# Patient Record
Sex: MALE | Race: WHITE | HISPANIC OR LATINO | Employment: FULL TIME | URBAN - METROPOLITAN AREA
[De-identification: names, ages, dates, MRNs, and addresses within clinical notes are randomized per-mention and may not be internally consistent; named-entity substitution may affect disease eponyms.]

---

## 2018-07-23 ENCOUNTER — LAB VISIT (OUTPATIENT)
Dept: LAB | Facility: HOSPITAL | Age: 43
End: 2018-07-23
Attending: FAMILY MEDICINE
Payer: COMMERCIAL

## 2018-07-23 ENCOUNTER — OFFICE VISIT (OUTPATIENT)
Dept: FAMILY MEDICINE | Facility: CLINIC | Age: 43
End: 2018-07-23
Payer: COMMERCIAL

## 2018-07-23 VITALS
TEMPERATURE: 98 F | SYSTOLIC BLOOD PRESSURE: 105 MMHG | HEIGHT: 74 IN | WEIGHT: 265.63 LBS | HEART RATE: 71 BPM | OXYGEN SATURATION: 97 % | BODY MASS INDEX: 34.09 KG/M2 | DIASTOLIC BLOOD PRESSURE: 78 MMHG

## 2018-07-23 DIAGNOSIS — M25.521 RIGHT ELBOW PAIN: ICD-10-CM

## 2018-07-23 DIAGNOSIS — Z00.00 VISIT FOR WELL MAN HEALTH CHECK: ICD-10-CM

## 2018-07-23 DIAGNOSIS — Z23 NEED FOR DIPHTHERIA-TETANUS-PERTUSSIS (TDAP) VACCINE: ICD-10-CM

## 2018-07-23 DIAGNOSIS — Z00.00 VISIT FOR WELL MAN HEALTH CHECK: Primary | ICD-10-CM

## 2018-07-23 LAB
25(OH)D3+25(OH)D2 SERPL-MCNC: 36 NG/ML
ALBUMIN SERPL BCP-MCNC: 4.1 G/DL
ALP SERPL-CCNC: 69 U/L
ALT SERPL W/O P-5'-P-CCNC: 191 U/L
ANION GAP SERPL CALC-SCNC: 8 MMOL/L
AST SERPL-CCNC: 81 U/L
BASOPHILS # BLD AUTO: 0.03 K/UL
BASOPHILS NFR BLD: 0.5 %
BILIRUB SERPL-MCNC: 0.7 MG/DL
BUN SERPL-MCNC: 15 MG/DL
CALCIUM SERPL-MCNC: 9.8 MG/DL
CHLORIDE SERPL-SCNC: 100 MMOL/L
CHOLEST SERPL-MCNC: 219 MG/DL
CHOLEST/HDLC SERPL: 6.8 {RATIO}
CO2 SERPL-SCNC: 28 MMOL/L
CREAT SERPL-MCNC: 0.9 MG/DL
DIFFERENTIAL METHOD: NORMAL
EOSINOPHIL # BLD AUTO: 0.1 K/UL
EOSINOPHIL NFR BLD: 1.4 %
ERYTHROCYTE [DISTWIDTH] IN BLOOD BY AUTOMATED COUNT: 12.1 %
EST. GFR  (AFRICAN AMERICAN): >60 ML/MIN/1.73 M^2
EST. GFR  (NON AFRICAN AMERICAN): >60 ML/MIN/1.73 M^2
ESTIMATED AVG GLUCOSE: 100 MG/DL
GLUCOSE SERPL-MCNC: 91 MG/DL
HBA1C MFR BLD HPLC: 5.1 %
HCT VFR BLD AUTO: 47.7 %
HDLC SERPL-MCNC: 32 MG/DL
HDLC SERPL: 14.6 %
HGB BLD-MCNC: 15.8 G/DL
IMM GRANULOCYTES # BLD AUTO: 0.02 K/UL
IMM GRANULOCYTES NFR BLD AUTO: 0.3 %
LDLC SERPL CALC-MCNC: 145.2 MG/DL
LYMPHOCYTES # BLD AUTO: 2.5 K/UL
LYMPHOCYTES NFR BLD: 38.8 %
MCH RBC QN AUTO: 28.5 PG
MCHC RBC AUTO-ENTMCNC: 33.1 G/DL
MCV RBC AUTO: 86 FL
MONOCYTES # BLD AUTO: 0.6 K/UL
MONOCYTES NFR BLD: 9.1 %
NEUTROPHILS # BLD AUTO: 3.2 K/UL
NEUTROPHILS NFR BLD: 49.9 %
NONHDLC SERPL-MCNC: 187 MG/DL
NRBC BLD-RTO: 0 /100 WBC
PLATELET # BLD AUTO: 305 K/UL
PMV BLD AUTO: 10 FL
POTASSIUM SERPL-SCNC: 4.1 MMOL/L
PROT SERPL-MCNC: 7.8 G/DL
RBC # BLD AUTO: 5.54 M/UL
SODIUM SERPL-SCNC: 136 MMOL/L
TRIGL SERPL-MCNC: 209 MG/DL
TSH SERPL DL<=0.005 MIU/L-ACNC: 1.45 UIU/ML
WBC # BLD AUTO: 6.37 K/UL

## 2018-07-23 PROCEDURE — 85025 COMPLETE CBC W/AUTO DIFF WBC: CPT

## 2018-07-23 PROCEDURE — 99999 PR PBB SHADOW E&M-NEW PATIENT-LVL IV: CPT | Mod: PBBFAC,,, | Performed by: FAMILY MEDICINE

## 2018-07-23 PROCEDURE — 84443 ASSAY THYROID STIM HORMONE: CPT

## 2018-07-23 PROCEDURE — 99386 PREV VISIT NEW AGE 40-64: CPT | Mod: 25,S$GLB,, | Performed by: FAMILY MEDICINE

## 2018-07-23 PROCEDURE — 90715 TDAP VACCINE 7 YRS/> IM: CPT | Mod: S$GLB,,, | Performed by: FAMILY MEDICINE

## 2018-07-23 PROCEDURE — 36415 COLL VENOUS BLD VENIPUNCTURE: CPT | Mod: PO

## 2018-07-23 PROCEDURE — 82306 VITAMIN D 25 HYDROXY: CPT

## 2018-07-23 PROCEDURE — 90471 IMMUNIZATION ADMIN: CPT | Mod: S$GLB,,, | Performed by: FAMILY MEDICINE

## 2018-07-23 PROCEDURE — 83036 HEMOGLOBIN GLYCOSYLATED A1C: CPT

## 2018-07-23 PROCEDURE — 80061 LIPID PANEL: CPT

## 2018-07-23 PROCEDURE — 80053 COMPREHEN METABOLIC PANEL: CPT

## 2018-07-23 RX ORDER — IBUPROFEN 600 MG/1
600 TABLET ORAL
Qty: 42 TABLET | Refills: 0 | Status: SHIPPED | OUTPATIENT
Start: 2018-07-23 | End: 2018-08-06

## 2018-07-23 NOTE — PROGRESS NOTES
Subjective:       Patient ID: Sergey Cabrera is a 43 y.o. male.    Chief Complaint: Annual Exam and Establish Care    Sergey Cabrera is a 43 y.o. male who presents today to establish care.     Diet: he usually eats out more then 1/2 his meals. He eats fast food rarely. He usually eats a lot of chinese buffets. He drinks mostly water. Soda about every other day. Doesn't drink juice or sweet tea. Drinks coffee every morning with sweet and low.   Exercise: he doesn't exercise outside of work. He plays softball occasionally.     Tdap: none since Alisha  Labs: ordered    C-scope: at 52    PMHx: reviewed in EMR and updated  Meds: reviewed in EMR and updated  Shx: reviewed in EMR and updated  FMHx: no family history of colon cancer, breast cancer, ovarian cancer  Social: lives with his wife and 4 kids (one from a previous marriage). He works for [employer]. No pets at home. No smokers at home.       Review of Systems   Constitutional: Negative for chills and fever.   Respiratory: Negative for cough and shortness of breath.    Cardiovascular: Negative for chest pain and palpitations.   Gastrointestinal: Negative for constipation, diarrhea, nausea and vomiting.   Genitourinary: Negative for difficulty urinating.   Skin: Negative for rash.         Health Maintenance Due   Topic Date Due    Lipid Panel  11/14/2012       Objective:     Vitals:    07/23/18 1308   BP: 105/78   Pulse: 71   Temp: 98.2 °F (36.8 °C)        Physical Exam   Constitutional: He is oriented to person, place, and time. He appears well-developed and well-nourished.   HENT:   Head: Normocephalic and atraumatic.   Right Ear: Tympanic membrane, external ear and ear canal normal.   Left Ear: Tympanic membrane, external ear and ear canal normal.   Nose: Nose normal.   Mouth/Throat: Oropharynx is clear and moist and mucous membranes are normal. No tonsillar exudate.   Eyes: Conjunctivae are normal. Pupils are equal, round, and reactive to light.   Neck: Normal  range of motion. Neck supple.   Cardiovascular: Normal rate, regular rhythm and normal heart sounds.    Pulmonary/Chest: Effort normal and breath sounds normal.   Abdominal: Soft. He exhibits no distension. There is no tenderness.   obese   Musculoskeletal:   Self described right elbow pain. Unable to reproduce during exam. No limitation in strength or sensation.    Neurological: He is alert and oriented to person, place, and time. No cranial nerve deficit or sensory deficit. He exhibits normal muscle tone.   Skin: Skin is warm and dry.   Psychiatric: He has a normal mood and affect. His speech is normal and behavior is normal. Judgment and thought content normal.   Nursing note and vitals reviewed.      Assessment:       1. Visit for well man health check    2. Need for diphtheria-tetanus-pertussis (Tdap) vaccine    3. Right elbow pain    4. BMI 34.0-34.9,adult        Plan:         Visit for well man health check  ?Avoid tobacco  ?Be physically active  ?Maintain a healthy weight  ?Eat a diet rich in fruits, vegetables, and whole grains, and low in saturated/trans fat  ?Limit alcohol consumption  ?Protect against sexually transmitted infections  ?Avoid excess sun  -     CBC auto differential; Future; Expected date: 07/23/2018  -     Comprehensive metabolic panel; Future; Expected date: 07/23/2018  -     Hemoglobin A1c; Future; Expected date: 07/23/2018  -     Lipid panel; Future; Expected date: 07/23/2018  -     TSH; Future; Expected date: 07/23/2018  -     Vitamin D; Future; Expected date: 07/23/2018    Need for diphtheria-tetanus-pertussis (Tdap) vaccine  -     Tdap Vaccine    Right elbow pain  Ibuprofen  Continue use of brace  PT  RTC if not improved, can consider imaging and/or referral at that time  -     ibuprofen (ADVIL,MOTRIN) 600 MG tablet; Take 1 tablet (600 mg total) by mouth 3 (three) times daily with meals. for 14 days  Dispense: 42 tablet; Refill: 0  -     Ambulatory Referral to Physical/Occupational  Therapy    BMI 34.0-34.9,adult  See AVS  Decrease soda intake  Start exercise regimen

## 2018-07-23 NOTE — PATIENT INSTRUCTIONS
Small sustainable changes  Increase intake of fiber  Decrease carb intake  Decrease drinks with added sugar such as soda or juice  Milk and plain yogurt  Dressings, seasonings, condiments, etc should have less than 2 g sugars.   beans or nuts can have 1 x a day.   1-2 servings of citrus fruits, berries, pineapple or melon a day (1/2 cup)  Avoid fried foods  No grains, rice, pasta, potatoes, bread, corn, peas, oatmeal, grits, tortillas, crackers, chips  Increase exercise  Handouts given  Consider BIOeCON lynne    ?Avoid tobacco  ?Be physically active  ?Maintain a healthy weight  ?Eat a diet rich in fruits, vegetables, and whole grains, and low in saturated/trans fat  ?Limit alcohol consumption  ?Protect against sexually transmitted infections  ?Avoid excess sun        4 Steps for Eating Healthier  Changing the way you eat can improve your health. It can lower your cholesterol and blood pressure, and help you stay at a healthy weight. Your diet doesnt have to be bland and boring to be healthy. Just watch your calories and follow these steps:    1. Eat fewer unhealthy fats  · Choose more fish and lean meats instead of fatty cuts of meat.  · Skip butter and lard, and use less margarine.  · Pass on foods that have palm, coconut, or hydrogenated oils.  · Eat fewer high-fat dairy foods like cheese, ice cream, and whole milk.  · Get a heart-healthy cookbook and try some low-fat recipes.  2. Go light on salt  · Keep the saltshaker off the table.  · Limit high-salt ingredients, such as soy sauce, bouillon, and garlic salt.  · Instead of adding salt when cooking, season your food with herbs and flavorings. Try lemon, garlic, and onion.  · Limit convenience foods, such as boxed or canned foods and restaurant food.  · Read food labels and choose lower-sodium options.  3. Limit sugar  · Pause before you add sugars to pancakes, cereal, coffee, or tea. This includes white and brown table sugar, syrup, honey, and molasses. Cut  your usual amount by half.  · Use non-sugar sweeteners. Stevia, aspartame, and sucralose can satisfy a sweet tooth without adding calories.  · Swap out sugar-filled soda and other drinks. Buy sugar-free or low-calorie beverages. Remember water is always the best choice.  · Read labels and choose foods with less added sugar. Keep in mind that dairy foods and foods with fruit will have some natural sugar.  · Cut the sugar in recipes by 1/3 to 1/2. Boost the flavor with extracts like almond, vanilla, or orange. Or add spices such as cinnamon or nutmeg.  4. Eat more fiber  · Eat fresh fruits and vegetables every day.  · Boost your diet with whole grains. Go for oats, whole-grain rice, and bran.  · Add beans and lentils to your meals.  · Drink more water to match your fiber increase. This is to help prevent constipation.  Date Last Reviewed: 5/11/2015  © 5316-9459 [employer]. [address] [place] [address]. All rights reserved. This information is not intended as a substitute for professional medical care. Always follow your healthcare professional's instructions.        Understanding Fat and Cholesterol  Too much cholesterol in your blood can lead to many problems such as blocked arteries. This can lead to heart attack and stroke. One of the best ways to manage heart and blood vessel disease is to lower your blood cholesterol. Planning meals that are low in saturated fat and cholesterol helps reduce the level of cholesterol in your blood. Below are eating tips to help lower your blood cholesterol levels.  Eat less fat  A healthy goal is to have less than 25% to 35% of your daily calories come from fat. Instead of fats, eat more fruits, whole-grains, and vegetables. This also helps control your weight, and can even reduce your risk for some cancers. There are different kinds of fats in foods. Fats can be saturated, unsaturated, or trans fats. The best fats to choose are unsaturated fats. But  fats are high in calories, so eat even unsaturated fats sparingly.  Limit foods high in saturated fats  Saturated fats come from animals and certain plants (such as coconut and palm). Eating too much saturated fat can raise your blood cholesterol levels and make your artery problems worse. Your goal is to eat less saturated fat. Below are some examples of foods that contain lots of saturated fat:  · Fatty cuts of meat (lamb, ham, beef)  · Many pastries, cakes, cookies, and candies  · Cream, ice cream, sour cream, cheese, and butter, and foods made with them  · Sauces made with butter or cream  · Salad dressings with saturated fats  · Foods that contain palm or coconut oil  Choose unsaturated fats  Unsaturated fats are usually liquid at room temperature. They are better choices for your heart than saturated fat. There are two types of unsaturated fats: polyunsaturated fat and monounsaturated fat. Aim to replace saturated fats with polyunsaturated or monounsaturated fats.  · Polyunsaturated fats are found in corn oil, safflower oil, sunflower oil, and other vegetable oils.  · Monounsaturated fats are found in olive oil, canola oil, and peanut oil. Some margarines and spreads are now made with these oils, too. Avocados are also high in monounsaturated fat.  Of all fats, monounsaturated fats are the least harmful to your heart.  Avoid trans fats  Like saturated fats, trans fats have been linked to heart disease. Even a small amount can harm your health. Trans fats are found in liquid oils that have been changed to be solid at room temperature. Margarine, which is often made from vegetable oil, is one example. Vegetable shortening is another. Trans fats are often found in packaged goods. Check ingredients for the words hydrogenated or partially hydrogenated. They mean the foods contain trans fat.  What about triglycerides?  Triglycerides are a type of fat in your blood. Like cholesterol, high levels of triglycerides  can lead to blocked arteries. High triglyceride levels can be reduced 20% to 50%  by limiting added sugars in your diet, susbstituting healthier fats for saturated and trans fats, getting more physical activity, and losing weight if your are overweight. You may also be advised to avoid or limi alcohol.    Reading food labels  Luckily, most foods now have labels giving you the facts about what youre eating. Reading food labels helps you make healthy choices. Look for the words highlighted below.  · Serving Size. This is the amount of food in 1 serving. If you eat larger portions, be sure to count more of everything: fat, calories, and cholesterol.  · Total Fat. Tells you how many grams (g) of fat are in 1 serving.  · Calories from Fat. This tells you the total number of calories from fat in 1 serving (there are 9 calories per gram of fat). Look for foods with the fewest calories from fat.  · Saturated Fat. Tells you how many grams (g) of saturated fat are in 1 serving.  · Trans Fat. Tells how many grams (g) of trans fat are in 1 serving.  · Cholesterol. Tells you how many milligrams (mg) of cholesterol are in 1 serving.  Date Last Reviewed: 5/11/2015  © 9668-1780 [employer]. [address] [place] [address], [place] [address]. All rights reserved. This information is not intended as a substitute for professional medical care. Always follow your healthcare professional's instructions.

## 2018-07-24 ENCOUNTER — TELEPHONE (OUTPATIENT)
Dept: FAMILY MEDICINE | Facility: CLINIC | Age: 43
End: 2018-07-24

## 2018-07-24 DIAGNOSIS — R79.89 ELEVATED LFTS: Primary | ICD-10-CM

## 2018-07-24 NOTE — TELEPHONE ENCOUNTER
I spoke with patient and informed him of his labs. I scheduled follow up US and Labs.Follow up appointment scheduled. Patient voiced understanding.

## 2018-07-24 NOTE — TELEPHONE ENCOUNTER
Please call patient with his lab results.     He has elevated cholesterol, high triglycerides, and low HDL. Triglycerides improve with a change in diet. He needs to eat out less and decrease intake of soda/sweet tea.     He also had elevated liver function tests (AST 81 and ). I have ordered a workup. Please schedule the US and labs along with a follow up in aprox 1 month.

## 2018-07-26 ENCOUNTER — TELEPHONE (OUTPATIENT)
Dept: FAMILY MEDICINE | Facility: CLINIC | Age: 43
End: 2018-07-26

## 2018-07-26 NOTE — TELEPHONE ENCOUNTER
----- Message from Amanda Benites sent at 7/26/2018  9:44 AM CDT -----  Contact: [phone]/self  Pt requesting to speak with you concerning canceling his upcoming ultrasound.  He would like to speak with the nurse before he cancels.    Please call and advise

## 2018-07-26 NOTE — TELEPHONE ENCOUNTER
Patient state that he have a fatty liver and would like to make you aware of that.Patient would like to know if this can effect the count? Patient also state that he have high triglycerides and he doesn't feel that a ultra sound is necessary. Patient believe if he eat right and exercise  He can get triglycerides down. Patient said that he don't want to be on medication but if you want him then he will.Please advise.

## 2018-07-27 NOTE — TELEPHONE ENCOUNTER
I spoke with patient and advised him that  would like for him to get the US of the liver.Patient state that he think its a waste of time but he'll get it done. I advised patient what day the ultra sound is scheduled.Patient voiced understanding.

## 2018-08-20 ENCOUNTER — CLINICAL SUPPORT (OUTPATIENT)
Dept: REHABILITATION | Facility: HOSPITAL | Age: 43
End: 2018-08-20
Attending: FAMILY MEDICINE
Payer: COMMERCIAL

## 2018-08-20 DIAGNOSIS — M25.521 PAIN IN RIGHT ELBOW: ICD-10-CM

## 2018-08-20 PROCEDURE — 97165 OT EVAL LOW COMPLEX 30 MIN: CPT | Mod: PN

## 2018-08-20 PROCEDURE — 97140 MANUAL THERAPY 1/> REGIONS: CPT | Mod: PN

## 2018-08-20 NOTE — PATIENT INSTRUCTIONS
[facility]  OCCUPATIONAL THERAPY  HOME EXERCISE PROGRAM        [place]: [phone]  Hold each stretch 30 sec, repeat 4xday         Therapist: LIZA Munoz, OTR/L, CHT   Occupational therapist, Certified Hand Therapist

## 2018-08-20 NOTE — PLAN OF CARE
[facility] Occupational Therapy  Initial Evaluation     Name: Sergey Cabrera  Clinic Number: [id number]    Medical Diagnosis: Right elbow pain  Date of Onset: 2-3 months ago  Date of Surgery: NA  Surgical Procedure: NA  Therapy Diagnosis:   Encounter Diagnosis   Name Primary?    Pain in right elbow      Precautions: Standard    Physician: Ze Mejia DO  Physician Orders: eval and treat  Date of Return to MD: 10/29/2018    Evaluation Date: 8/20/2018  Plan of Care Certification Period: 08/20/2018-10/20/2018    Visit #: 1/ Visits authorized: 30  Insurance Authorization period Expiration: 12/31/2018    Time In:1PM  Time Out: 2PM  Total Billable Time: 60 minutes  Charges for this Visit: Low eval      Subjective     Involved Side:  right  Dominant Side: Right  Imaging: N/A  History of Current Condition: Started after playing softball, comes and goes, wrist extension and  aggravate it.   Previous Therapy: None    Past Medical History/Physical Systems Review:   No past medical history on file.  Sergey Cabrera  has a past surgical history that includes Tonsillectomy.    Sergey currently has no medications in their medication list.    Review of patient's allergies indicates:   Allergen Reactions    Grass pollen-ashleigh grass standard     Tree pollen-riddhi         Pain:  Functional Pain Scale Rating 0-10:   0/10 at current  0/10 at best  3/10 at worst  Location: posterior elbow, extensor muscle wad  Description: Aching and Sharp  Aggravating Factors: Bending, Flexing and Lifting  Easing Factors: rest and position change      Patient's Goals for Therapy: See Assessment chart below.    Occupation:  See Assessment chart below.     Functional Limitations/Social History: See Assessment chart below.       Objective     Observation/Appearance:       Elbow and Wrist ROM. Measured in degrees.   8/20/2018 8/20/2018      Left Right     Elbow Ext/Flex 0/140 0/135     Supination/Pronation WNL WNL slight  discomfort with sup.      Wrist Ext/Flex WNL WNL     Wrist RD/UD WNL WNL        Strength (Dynamometer) and Pinch Strength (Pinch Gauge)  Measured in pounds.   8/20/2018 8/20/2018          Left Right     Rung II elbow flx 95 95     Rung II elbow extended 100 115     Key Pinch 20 21     3pt Pinch 21 19     2pt Pinch 14 15       Sensation:    Numbness reported in bilateral hands reported during use of ellipitical machine at gym, pain in posterior elbow when resting on arm rest, numbness in bilateral hands up arms (non-localized )     Manual Muscle Test   8/20/2018 8/20/2018      Left Right     Wrist Extension  5/5 4+/5      Wrist Flexion 5/5 4+/5       Special Tests  Tennis Elbow Test +   Resisted Middle Finger Extension Test +     CMS Impairment/Limitation/Restriction for FOTO Survey  Therapist reviewed FOTO scores for Sergey Cabrera.  FOTO documents entered into [facility] [employer] - see Media section.    Intake Limitation Score: 44% - 8/20/2018       Treatment     Treatment Time In (separate from total time): 125PM  Treatment Time Out (separate from total time: 137PM  Total Treatment Time: 12 min      Patient received MH x 10 min to right elbow to increase blood flow, circulation and tissue elasticity prior to therex    Astym stretches 1-3 x 30 sec each pre Astym and post Astym.     ASTYM treatment provided by ANDREW Jmienez (see progress note)     Issued HEP and educated on modality use for pain management (see patient instructions). Pt verbally understood the instructions as they were given and demonstrated proper form and technique during therapy. Pt was advised to perform these exercises free of pain, and to stop performing them if pain occurs.       Patient/Family Education: role of OT, goals for OT, scheduling/cancellations - pt verbalized understanding. Discussed insurance limitations with patient.    Assessment       Sergey Cabrera is a 43 y.o. male referred to outpatient occupational/hand therapy and  presents with a medical diagnosis of Right Elbow Pain and demonstrates limitations as described in the chart below.  Pain with repetitive lifting with wrist extension, with pinching small objects such as screws. Following evaluation and brief medical record review it is determined that pt will benefit from occupational therapy services in order to maximize pain free and/or functional use of right UE. The following goals were discussed with the patient and patient is in agreement with them as to be addressed in the treatment plan. The patient's rehab potential is Excellent.     Anticipated barriers to occupational therapy: none  Pt has no cultural, educational or language barriers to learning provided.    Profile and History Assessment of Occupational Performance Level of Clinical Decision Making Complexity Score   Occupational Profile:   Sergey Cabrera is a 43 y.o. 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    Sergey Cabrera has difficulty with  manual tool use, fishing, repetitive wrist extensionFishing  affecting his daily functional abilities. His main goal for therapy is decrease pain to increase ability to do his job and leisure activities.     Previous functional status: Independent with all ADLs.     Comorbidities:   See PMH and Physical Systems Review Section above.    Medical and Therapy History Review:   Brief               Performance Deficits    Physical:  Muscle Power/Strength  Muscle Endurance   Strength  Pinch Strength  Gross Motor Coordination  Fine Motor Coordination  Pain    Cognitive:  No Deficits    Psychosocial:    No Deficits     Clinical Decision Making:  low    Assessment Process:  Problem-Focused Assessments    Modification/Need for Assistance:  Not Necessary    Intervention Selection:  Limited Treatment  Options       low  Based on PMHX, co morbidities , data from assessments and functional level of assistance required with task and clinical presentation directly impacting function.         Goals     Long Term Goals (LTGs); to be met by discharge.  LTG #1: Pt will report a pain level of 0 out of 10 with fishing    LTG #2: Pt will demo improved FOTO score by 20 points.   LTG #3: Pt will return to prior level of function for ADLs and household management.     Short Term Goals (STGs); to be met within 4 weeks (09/20/2018).  STG #1a: Pt will report 1 out of 10 pain level with work ADLs.  STG #2a: Pt will report/demo [place] with putting out and picking up orange traffic cones for work.  STG #2b: Pt will report/demo [place] with use of manual tools and gripping.  STG #3a: Pt will demonstrate independence with issued HEP.       Plan     Pt to be treated by Occupational Therapy 2 times per week for 8  weeks during the certification period from 8/20/2018 to 10/20/2018 to achieve the established goals.     Treatment to include: Manual therapy/joint mobilizations, Modalities for pain management, US 3 mhz, Therapeutic exercises/activities., Strengthening, Orthotic Fabrication/Fit/Training, Joint Protection, Energy Conservation and astym, Kinesiology taping, as well as any other treatments deemed necessary based on the patient's needs or progress.     Therapist: LIZA Munoz, OTR/L, CHT   Occupational therapist, Certified Hand Therapist       I certify the need for these services furnished under this plan of treatment and while under my care    ____________________________________                         __________________  Physician/Referring Practitioner                                               Date of Signature

## 2018-08-21 NOTE — PROGRESS NOTES
Occupational Therapy Daily Treatment Note      Date: 8/20/2018  Name: Sergey Cabrera  Clinic Number: [id number]    Medical Diagnosis: R Elbow pain  Date of Onset: 2-3 months ago  Date of Surgery: NA  Surgical Procedure: NA  Therapy Diagnosis:   Encounter Diagnosis   Name Primary?    Pain in right elbow      Precautions: Standard    Physician: Ze Mejia DO  Physician Orders: eval and treat   Date of Return to MD: 10/29/2018    Evaluation Date: 8/20/2018  Plan of Care Certification Period: 8/20/2018-10/20/2018    Visit #: 1/ Visits authorized: 30  Insurance Authorization period Expiration: 12/31/2018    Time In:137  Time Out: 147  Total Billable Time: 10 minutes  Charges for this Visit: MT1    Subjective     See Fabiana fuentes    Objective     Sergey received the following manual therapy techniques for 10 minutes:   Pt received Astym UE progression for lateral epicondylitis treatment to right    Home Exercises and Education Provided     Education provided: on ASTYM precautions and what to expect  - Progress towards goals     Assessment     Pt with signs and symptoms of fibrotic tissue noted during ASTYM treatment however tolerated well. Pt would continued to benefit from continues progression of ASTYM treatment.     Goals     See Fabiana Fuentes    Plan     Continue skilled occupational therapy with individualized plan of care 2x/week for 8 weeks from 8/20/2018 to 10/20/2018.    Discussed Plan of Care with patient: Yes    Lluvia MCDANIELS

## 2018-08-23 ENCOUNTER — CLINICAL SUPPORT (OUTPATIENT)
Dept: REHABILITATION | Facility: HOSPITAL | Age: 43
End: 2018-08-23
Attending: FAMILY MEDICINE
Payer: COMMERCIAL

## 2018-08-23 DIAGNOSIS — M25.521 PAIN IN RIGHT ELBOW: ICD-10-CM

## 2018-08-23 PROCEDURE — 97140 MANUAL THERAPY 1/> REGIONS: CPT | Mod: PN

## 2018-08-23 PROCEDURE — 97110 THERAPEUTIC EXERCISES: CPT | Mod: PN

## 2018-08-23 NOTE — PROGRESS NOTES
"  Occupational Therapy Daily Treatment Note      Date: 8/23/2018  Name: Sergey Cabrera  Clinic Number: [id number]    Medical Diagnosis: R Elbow pain  Date of Onset: 2-3 months ago  Date of Surgery: NA  Surgical Procedure: NA  Therapy Diagnosis: R elbow pain  Precautions: Standard    Physician: Ze Mejia DO  Physician Orders: eval and treat   Date of Return to MD: 10/29/2018    Evaluation Date: 8/20/2018  Plan of Care Certification Period: 8/20/2018-10/20/2018    Visit #: 2/ Visits authorized: 30  Insurance Authorization period Expiration: 12/31/2018    Time In:700  Time Out: 745  Total Billable Time: 35 minutes  Charges for this Visit: MT2 TE1    Subjective   2/10  "I've been doing my stretches, the treatment seems to be helping, the pain isn't as sharp as it was."    Objective     Sergey performed the following therex UBE @120 rpms x 6min for/back. ASTYM stretches for wrist extensors, flexors, and triceps 3x30".     Sergey received the following manual therapy techniques for 25 minutes:   Pt received Astym tx #2 UE progression for elbow, wrist and hand treatment to right  Repeat stretches  Kinesiotaping: I strip applied to inhibit wrist extensors and triceps. Patient instructed on purpose, wear, care, precautions to monitor and removal of KT. Patient verbalized understanding of all instructions provided.   CP x 10 minutes for pain mgmt after session    Home Exercises and Education Provided     Education provided: on ASTYM precautions and what to expect  - Progress towards goals     Assessment     Pt with signs and symptoms of fibrotic tissue noted during ASTYM treatment however tolerated well. Improved from previous session. Good compliance with stretches and resting RUE with work tasks. Pain improving. Pt would continued to benefit from continues progression of ASTYM treatment.     Goals     Long Term Goals (LTGs); to be met by discharge.  LTG #1: Pt will report a pain level of 0 out of 10 with fishing        "        LTG #2: Pt will demo improved FOTO score by 20 points.   LTG #3: Pt will return to prior level of function for ADLs and household management.      Short Term Goals (STGs); to be met within 4 weeks (09/20/2018).  STG #1a: Pt will report 1 out of 10 pain level with work ADLs.  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  STG #2b: Pt will report/demo [place] with use of manual tools and gripping.  STG #3a: Pt will demonstrate independence with issued HEP.        Plan     Continue skilled occupational therapy with individualized plan of care 2x/week for 8 weeks from 8/20/2018 to 10/20/2018.    Discussed Plan of Care with patient: Yes    Lluvia CUI/MAZIN

## 2018-08-28 ENCOUNTER — CLINICAL SUPPORT (OUTPATIENT)
Dept: REHABILITATION | Facility: HOSPITAL | Age: 43
End: 2018-08-28
Attending: FAMILY MEDICINE
Payer: COMMERCIAL

## 2018-08-28 DIAGNOSIS — M25.521 PAIN IN RIGHT ELBOW: ICD-10-CM

## 2018-08-28 PROCEDURE — 97110 THERAPEUTIC EXERCISES: CPT | Mod: PN

## 2018-08-28 PROCEDURE — 97140 MANUAL THERAPY 1/> REGIONS: CPT | Mod: PN

## 2018-08-28 NOTE — PROGRESS NOTES
"  Occupational Therapy Daily Treatment Note      Date: 8/28/2018  Name: Sergey Cabrera  Clinic Number: [id number]    Medical Diagnosis: R Elbow pain  Date of Onset: 2-3 months ago  Date of Surgery: NA  Surgical Procedure: NA  Therapy Diagnosis: R elbow pain  Precautions: Standard    Physician: Ze Mejia DO  Physician Orders: eval and treat   Date of Return to MD: 10/29/2018    Evaluation Date: 8/20/2018  Plan of Care Certification Period: 8/20/2018-10/20/2018    Visit #: 3/ Visits authorized: 30  Insurance Authorization period Expiration: 12/31/2018    Time In:650  Time Out:740  Total Billable Time: 40 minutes  Charges for this Visit: MT2 TE1    Subjective   5/10  "I went fishing over the weekend and I didn't have a problem, I'm just a little sore."    Objective     Sergey performed the following therex UBE @120 rpms x 6min for/back. ASTYM stretches for wrist extensors, flexors, and triceps 3x30".     Sergey received the following manual therapy techniques for 25 minutes:   Pt received Astym tx #3 UE progression for elbow, wrist and hand treatment to right  Repeat stretches    Exercises    Nirschl's 1-4 3  2/10   Tbar twists 10x   Triceps ext 4#  2/10   Elbow flex 3 ways 4#  10x   Tband Lats Blue  2/15   Rows Blue  2/15   Wall clocks Green  2/10                 CP x 10 minutes for pain mgmt after session    Home Exercises and Education Provided     Education provided: on ASTYM precautions and what to expect  - Progress towards goals     Assessment     Pt with signs and symptoms of fibrotic tissue noted during ASTYM treatment however tolerated well. Improved from previous session. Overall pain with activity improving. He tolerated progression of treatment well.  Good compliance with stretches and resting RUE with work tasks. Pt would continued to benefit from continues progression of ASTYM treatment.     Goals     Long Term Goals (LTGs); to be met by discharge.  LTG #1: Pt will report a pain level of 0 out of 10 " with fishing               LTG #2: Pt will demo improved FOTO score by 20 points.   LTG #3: Pt will return to prior level of function for ADLs and household management.      Short Term Goals (STGs); to be met within 4 weeks (09/20/2018).  STG #1a: Pt will report 1 out of 10 pain level with work ADLs.  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  STG #2b: Pt will report/demo [place] with use of manual tools and gripping.  STG #3a: Pt will demonstrate independence with issued HEP.        Plan     Continue skilled occupational therapy with individualized plan of care 2x/week for 8 weeks from 8/20/2018 to 10/20/2018.    Discussed Plan of Care with patient: Yes    Lluvia CUI/MAZIN

## 2018-08-29 NOTE — PROGRESS NOTES
"  Occupational Therapy Daily Treatment Note      Date: 8/30/2018  Name: Sergey Cabrera  Clinic Number: [id number]    Medical Diagnosis: R Elbow pain  Date of Onset: 2-3 months ago  Date of Surgery: NA  Surgical Procedure: NA  Therapy Diagnosis: R elbow pain  Precautions: Standard    Physician: Ze Mejia DO  Physician Orders: eval and treat   Date of Return to MD: 10/29/2018    Evaluation Date: 8/20/2018  Plan of Care Certification Period: 8/20/2018-10/20/2018    Visit #: 4/ Visits authorized: 30  Insurance Authorization period Expiration: 12/31/2018    Time In:700  Time Out:755  Total Billable Time: 55 minutes  Charges for this Visit: MT2 TE2    Subjective   3/10  "I'm hurting today I went to push up on this arm the other night and it flared it up."    Objective     Sergey performed the following therex UBE @120 rpms x 6min for/back. ASTYM stretches for wrist extensors, flexors, and triceps 3x30".     Sergey received the following manual therapy techniques for 25 minutes:   Pt received Astym tx #4 UE progression for elbow, wrist and hand treatment to right  Repeat stretches    Exercises    Nirschl's 1-4 3  2/10   Tbar twists 10x   Triceps ext 4#  2/10   Elbow flex 3 ways 4#/3#prone  10x   Tband Lats Blue  2/15   Rows Blue  2/15   Wall clocks Green  2/10   Ballet bar pushups 2/15             Elbow and Wrist ROM. Measured in degrees.    8/20/2018 8/20/2018 08/30/2018          Left Right Right      Elbow Ext/Flex 0/140 0/135 WNL      Supination/Pronation WNL WNL slight discomfort with sup.   WNL     Wrist Ext/Flex WNL WNL  WNL     Wrist RD/UD WNL WNL  WNL         Strength (Dynamometer) and Pinch Strength (Pinch Gauge)  Measured in pounds.    8/20/2018 8/20/2018 08/30/2018                Left Right Right      Rung II elbow flx 95 95 100      Rung II elbow extended 100 115  115     Key Pinch 20 21  21     3pt Pinch 21 19  15     2pt Pinch 14 15  15        Manual Muscle Test    8/20/2018 8/20/2018 08/30/2018      "     Left Right Right      Wrist Extension  5/5 4+/5  4/5      Wrist Flexion 5/5 4+/5 5/5        Special Tests  Tennis Elbow Test +   Resisted Middle Finger Extension Test -     Kinesiotaping: Space correction with bio freeze to lat epi and med epi. Patient instructed on purpose, wear, care, precautions to monitor and removal of KT. Patient verbalized understanding of all instructions provided.     CP x 10 minutes for pain mgmt after session    Home Exercises and Education Provided     Education provided: on ASTYM precautions and what to expect  - Progress towards goals     Assessment     Pt with increased pain at lat epi today. Despite increased pain his objective measures improved since IE. He was also able to complete all therex with only minimal c/o pain. Pt continues signs and symptoms of fibrotic tissue noted during ASTYM treatment however tolerated well and continues to improve. Good compliance with stretches and resting RUE with work tasks. Pt would continued to benefit from continues progression of ASTYM treatment.     Goals     Long Term Goals (LTGs); to be met by discharge.  LTG #1: Pt will report a pain level of 0 out of 10 with fishing               LTG #2: Pt will demo improved FOTO score by 20 points.   LTG #3: Pt will return to prior level of function for ADLs and household management.      Short Term Goals (STGs); to be met within 4 weeks (09/20/2018).  STG #1a: Pt will report 1 out of 10 pain level with work ADLs.  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  STG #2b: Pt will report/demo [place] with use of manual tools and gripping.  STG #3a: Pt will demonstrate independence with issued HEP.      Plan     Continue skilled occupational therapy with individualized plan of care 2x/week for 8 weeks from 8/20/2018 to 10/20/2018.    Discussed Plan of Care with patient: Yes    Lluvia CUI/MAZIN

## 2018-08-30 ENCOUNTER — CLINICAL SUPPORT (OUTPATIENT)
Dept: REHABILITATION | Facility: HOSPITAL | Age: 43
End: 2018-08-30
Attending: FAMILY MEDICINE
Payer: COMMERCIAL

## 2018-08-30 DIAGNOSIS — M25.521 PAIN IN RIGHT ELBOW: ICD-10-CM

## 2018-08-30 PROCEDURE — 97140 MANUAL THERAPY 1/> REGIONS: CPT | Mod: PN

## 2018-08-30 PROCEDURE — 97110 THERAPEUTIC EXERCISES: CPT | Mod: PN

## 2018-09-11 ENCOUNTER — CLINICAL SUPPORT (OUTPATIENT)
Dept: REHABILITATION | Facility: HOSPITAL | Age: 43
End: 2018-09-11
Attending: FAMILY MEDICINE
Payer: COMMERCIAL

## 2018-09-11 DIAGNOSIS — M25.521 PAIN IN RIGHT ELBOW: ICD-10-CM

## 2018-09-11 PROCEDURE — 97140 MANUAL THERAPY 1/> REGIONS: CPT | Mod: PN

## 2018-09-11 PROCEDURE — 97110 THERAPEUTIC EXERCISES: CPT | Mod: PN

## 2018-09-11 NOTE — PROGRESS NOTES
"  Occupational Therapy Daily Treatment Note      Date: 9/11/2018  Name: Sergey Cabrera  Clinic Number: [id number]    Medical Diagnosis: R Elbow pain  Date of Onset: 2-3 months ago  Date of Surgery: NA  Surgical Procedure: NA  Therapy Diagnosis: R elbow pain  Precautions: Standard    Physician: Ze Mejia DO  Physician Orders: eval and treat   Date of Return to MD: 10/29/2018    Evaluation Date: 8/20/2018  Plan of Care Certification Period: 8/20/2018-10/20/2018    Visit #: 5/ Visits authorized: 30  Insurance Authorization period Expiration: 12/31/2018  FOTO:36%    Time In:655  Time Out:750  Total Billable Time: 55 minutes  Charges for this Visit: MT2 TE2    Subjective   4-5/10  "It's like an aching pain not so much a sharp pain."    Objective     Sergey performed the following therex UBE @120 rpms x 6min for/back. ASTYM stretches for wrist extensors, flexors, and triceps 3x30".     Sergey received the following manual therapy techniques for 25 minutes:   Pt received Astym tx #5 UE progression for elbow, wrist and hand treatment to right  Repeat stretches    Exercises    Nirschl's 1-4 3  2/10   Tbar twists 10x   Triceps ext 4#  2/10   Elbow flex 3 ways 4#  2/10   Tband Lats Blue  2/15   Rows/IR/ER Blue  2/15   Wall clocks Green  2/10   Ballet bar pushups 2/15             Elbow and Wrist ROM. Measured in degrees.    8/20/2018 8/20/2018 08/30/2018        Left Right Right      Elbow Ext/Flex 0/140 0/135 WNL      Supination/Pronation WNL WNL slight discomfort with sup.   WNL     Wrist Ext/Flex WNL WNL  WNL     Wrist RD/UD WNL WNL  WNL         Strength (Dynamometer) and Pinch Strength (Pinch Gauge)  Measured in pounds.    8/20/2018 8/20/2018 08/30/2018                Left Right Right      Rung II elbow flx 95 95 100      Rung II elbow extended 100 115  115     Key Pinch 20 21  21     3pt Pinch 21 19  15     2pt Pinch 14 15  15        Manual Muscle Test    8/20/2018 8/20/2018 8/30/2018        Left Right Right    "   Wrist Extension  5/5 4+/5  4/5      Wrist Flexion 5/5 4+/5 5/5        Special Tests  Tennis Elbow Test +   Resisted Middle Finger Extension Test -     Kinesiotaping: Space correction with bio freeze to lat epi and med epi. Patient instructed on purpose, wear, care, precautions to monitor and removal of KT. Patient verbalized understanding of all instructions provided.     CP x 10 minutes for pain mgmt after session    Home Exercises and Education Provided     Education provided: on ASTYM precautions and what to expect  - Progress towards goals     Assessment     Pt reports pain is improving however his pain number report is slightly higher. He was able to complete therex in a pain free ROM increasing weights with some therex. Pt continues signs and symptoms of fibrotic tissue noted during ASTYM treatment however tolerated well and continues to improve. Responds well to ASTYM and KT application. Good compliance with stretches and resting RUE with work tasks. Pt would continued to benefit from continues progression of ASTYM treatment.     Goals     Long Term Goals (LTGs); to be met by discharge.  LTG #1: Pt will report a pain level of 0 out of 10 with fishing               LTG #2: Pt will demo improved FOTO score by 20 points.   LTG #3: Pt will return to prior level of function for ADLs and household management.      Short Term Goals (STGs); to be met within 4 weeks (09/20/2018).  STG #1a: Pt will report 1 out of 10 pain level with work ADLs.  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  STG #2b: Pt will report/demo [place] with use of manual tools and gripping.  STG #3a: Pt will demonstrate independence with issued HEP.      Plan     Continue skilled occupational therapy with individualized plan of care 2x/week for 8 weeks from 8/20/2018 to 10/20/2018.    Discussed Plan of Care with patient: Yes    Lluvia CUI/MAZIN

## 2018-09-13 ENCOUNTER — CLINICAL SUPPORT (OUTPATIENT)
Dept: REHABILITATION | Facility: HOSPITAL | Age: 43
End: 2018-09-13
Attending: FAMILY MEDICINE
Payer: COMMERCIAL

## 2018-09-13 DIAGNOSIS — M25.521 PAIN IN RIGHT ELBOW: ICD-10-CM

## 2018-09-13 PROCEDURE — 97110 THERAPEUTIC EXERCISES: CPT | Mod: PN

## 2018-09-13 PROCEDURE — 97140 MANUAL THERAPY 1/> REGIONS: CPT | Mod: PN

## 2018-09-13 NOTE — PROGRESS NOTES
"  Occupational Therapy Daily Treatment Note      Date: 9/13/2018  Name: Sergey Cabrera  Clinic Number: [id number]    Medical Diagnosis: R Elbow pain  Date of Onset: 2-3 months ago  Date of Surgery: NA  Surgical Procedure: NA  Therapy Diagnosis: R elbow pain  Precautions: Standard    Physician: Ze Mejia DO  Physician Orders: eval and treat   Date of Return to MD: 10/29/2018    Evaluation Date: 8/20/2018  Plan of Care Certification Period: 8/20/2018-10/20/2018    Visit #: 6/ Visits authorized: 30  Insurance Authorization period Expiration: 12/31/2018  FOTO:36%    Time In: 700  Time Out:740  Total Billable Time: 40 minutes  Charges for this Visit: MT1 TE2    Subjective   2-3/10  "I don't really feel my elbow today, my back is hurting me more."    Objective     Sergey performed the following therex UBE @120 rpms x 6min for/back. ASTYM stretches for wrist extensors, flexors, and triceps 3x30".     Sergey received the following manual therapy techniques for 25 minutes:   Pt received Astym tx #6 UE progression for elbow, wrist and hand treatment to right  Repeat stretches    Exercises    Nirschl's 1-4 4  2/10   Tbar twists 10x   Triceps ext 4#  2/10   Elbow flex 3 ways 4#  2/10   Tband Lats/Rows CC 23  2/15   IR/ER Blue  2/15   Wall clocks Green  2/10   Ballet bar pushups 2/15             Elbow and Wrist ROM. Measured in degrees.    8/20/2018 8/20/2018 08/30/2018        Left Right Right      Elbow Ext/Flex 0/140 0/135 WNL      Supination/Pronation WNL WNL slight discomfort with sup.   WNL     Wrist Ext/Flex WNL WNL  WNL     Wrist RD/UD WNL WNL  WNL         Strength (Dynamometer) and Pinch Strength (Pinch Gauge)  Measured in pounds.    8/20/2018 8/20/2018 08/30/2018                Left Right Right      Rung II elbow flx 95 95 100      Rung II elbow extended 100 115  115     Key Pinch 20 21  21     3pt Pinch 21 19  15     2pt Pinch 14 15  15        Manual Muscle Test    8/20/2018 8/20/2018 8/30/2018        Left " Right Right      Wrist Extension  5/5 4+/5  4/5      Wrist Flexion 5/5 4+/5 5/5        Special Tests  Tennis Elbow Test +   Resisted Middle Finger Extension Test -     Home Exercises and Education Provided     Education provided: on ASTYM precautions and what to expect  - Progress towards goals     Assessment     Pt with decreased pain report today. Good endurance with therex. Tolerated progression of treatment well.  Pt continues signs and symptoms of fibrotic tissue noted during ASTYM treatment however tolerated well and continues to improve. Responds well to ASTYM. Good compliance with stretches and resting RUE with work tasks. Reporting pain free with daily activities. Pt would continued to benefit from continues progression of ASTYM treatment.     Goals     Long Term Goals (LTGs); to be met by discharge.  LTG #1: Pt will report a pain level of 0 out of 10 with fishing               LTG #2: Pt will demo improved FOTO score by 20 points.   LTG #3: Pt will return to prior level of function for ADLs and household management.      Short Term Goals (STGs); to be met within 4 weeks (09/20/2018).  STG #1a: Pt will report 1 out of 10 pain level with work ADLs.  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  STG #2b: Pt will report/demo [place] with use of manual tools and gripping.  STG #3a: Pt will demonstrate independence with issued HEP.      Plan     Continue skilled occupational therapy with individualized plan of care 2x/week for 4 weeks from 8/20/2018 to 10/20/2018.    Discussed Plan of Care with patient: Yes    Lluvia CUI/MAZIN

## 2018-09-18 ENCOUNTER — CLINICAL SUPPORT (OUTPATIENT)
Dept: REHABILITATION | Facility: HOSPITAL | Age: 43
End: 2018-09-18
Attending: FAMILY MEDICINE
Payer: COMMERCIAL

## 2018-09-18 ENCOUNTER — TELEPHONE (OUTPATIENT)
Dept: FAMILY MEDICINE | Facility: CLINIC | Age: 43
End: 2018-09-18

## 2018-09-18 DIAGNOSIS — M25.521 PAIN IN RIGHT ELBOW: ICD-10-CM

## 2018-09-18 DIAGNOSIS — M25.521 RIGHT ELBOW PAIN: Primary | ICD-10-CM

## 2018-09-18 PROCEDURE — 97140 MANUAL THERAPY 1/> REGIONS: CPT | Mod: PN

## 2018-09-18 PROCEDURE — 97110 THERAPEUTIC EXERCISES: CPT | Mod: PN

## 2018-09-18 NOTE — TELEPHONE ENCOUNTER
----- Message from Sonam Garza sent at 9/18/2018  8:34 AM CDT -----  Contact: Self/ [phone]  Patient called to let you know his elbow is still hurting and would like to know if he can move his appointment sooner and can you advise on what to do.    Please call.

## 2018-09-18 NOTE — TELEPHONE ENCOUNTER
Patient experiencing less pain but continues with soreness and aches on elbow.  PT told him to let you know about this.  Do you want him to come in or referred him to pain management?  Please advise.

## 2018-09-18 NOTE — TELEPHONE ENCOUNTER
----- Message from Sonam Garza sent at 9/18/2018  8:34 AM CDT -----  Contact: Self/ [phone]  Patient called to let you know his elbow is still hurting and would like to know if he can move his appointment sooner and can you advise on what to do.    Please call.

## 2018-09-18 NOTE — PROGRESS NOTES
"  Occupational Therapy Daily Treatment Note      Date: 9/18/2018  Name: Sergey Cabrera  Clinic Number: [id number]    Medical Diagnosis: R Elbow pain  Date of Onset: 2-3 months ago  Date of Surgery: NA  Surgical Procedure: NA  Therapy Diagnosis: R elbow pain  Precautions: Standard    Physician: Ze Mejia DO  Physician Orders: eval and treat   Date of Return to MD: 10/29/2018    Evaluation Date: 8/20/2018  Plan of Care Certification Period: 8/20/2018-10/20/2018    Visit #: 7/ Visits authorized: 30  Insurance Authorization period Expiration: 12/31/2018  FOTO:36%    Time In:655  Time Out:745  Total Billable Time: 40 minutes  Charges for this Visit: MT1 TE2    Subjective   5/10  "I'm sore today but it's my fault I cut a tree down over the wknd." I don't hurt I'm just sore."    Objective     Sergey performed the following therex UBE @120 rpms x 6min for/back. ASTYM stretches for wrist extensors, flexors, and triceps 3x30".     Sergey received the following manual therapy techniques for 25 minutes:   Pt received Astym tx #7 UE progression for elbow, wrist and hand treatment to right  Repeat stretches    Exercises    Nirschl's 1-4 4  2/15   Tbar twists 10x   Triceps ext 4#  2/15   Elbow flex 3 ways 4#  2/15   Tband Lats/Rows CC 23  2/15   IR/ER Blue  2/15   Wall clocks Green  2/15   Ballet bar pushups 2/15             Elbow and Wrist ROM. Measured in degrees.    8/20/2018 8/20/2018 08/30/2018      Left Right Right    Elbow Ext/Flex 0/140 0/135 WNL    Supination/Pronation WNL WNL slight discomfort with sup.   WNL   Wrist Ext/Flex WNL WNL  WNL   Wrist RD/UD WNL WNL  WNL       Strength (Dynamometer) and Pinch Strength (Pinch Gauge)  Measured in pounds.    8/20/2018 8/20/2018 08/30/2018                Left Right Right      Rung II elbow flx 95 95 100      Rung II elbow extended 100 115  115     Key Pinch 20 21  21     3pt Pinch 21 19  15     2pt Pinch 14 15  15        Manual Muscle Test    8/20/2018 8/20/2018 " 8/30/2018        Left Right Right      Wrist Extension  5/5 4+/5  4/5      Wrist Flexion 5/5 4+/5 5/5        Special Tests  Tennis Elbow Test +   Resisted Middle Finger Extension Test -   CP x 10 minutes after session for pain mgmt  Home Exercises and Education Provided     Education provided: on ASTYM precautions and what to expect  - Progress towards goals     Assessment   Pt's pain increased today initially however he was able to perform all therex well. Pt continues signs and symptoms of fibrotic tissue noted during ASTYM treatment however tolerated well and continues to improve. Responds well to ASTYM. Good endurance and technique with therex. Progressing well to goals. Pt would continued to benefit from continues progression of ASTYM treatment.     Goals     Long Term Goals (LTGs); to be met by discharge.  LTG #1: Pt will report a pain level of 0 out of 10 with fishing               LTG #2: Pt will demo improved FOTO score by 20 points.   LTG #3: Pt will return to prior level of function for ADLs and household management.      Short Term Goals (STGs); to be met within 4 weeks (09/20/2018).  STG #1a: Pt will report 1 out of 10 pain level with work ADLs.  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  STG #2b: Pt will report/demo [place] with use of manual tools and gripping.  STG #3a: Pt will demonstrate independence with issued HEP.      Plan     Continue skilled occupational therapy with individualized plan of care 2x/week for 4 weeks from 8/20/2018 to 10/20/2018. Reassess next week with possible d/c to HEP    Discussed Plan of Care with patient: Yes    Lluvia CUI/MAZIN

## 2018-09-22 ENCOUNTER — HOSPITAL ENCOUNTER (OUTPATIENT)
Dept: RADIOLOGY | Facility: HOSPITAL | Age: 43
Discharge: HOME | End: 2018-09-22
Attending: FAMILY MEDICINE
Payer: COMMERCIAL

## 2018-09-22 DIAGNOSIS — M25.521 RIGHT ELBOW PAIN: ICD-10-CM

## 2018-09-22 PROCEDURE — 73070 X-RAY EXAM OF ELBOW: CPT | Mod: 26,RT,, | Performed by: RADIOLOGY

## 2018-09-22 PROCEDURE — 73070 X-RAY EXAM OF ELBOW: CPT | Mod: TC,FY,RT

## 2018-10-25 ENCOUNTER — LAB VISIT (OUTPATIENT)
Dept: LAB | Facility: HOSPITAL | Age: 43
End: 2018-10-25
Attending: FAMILY MEDICINE
Payer: COMMERCIAL

## 2018-10-25 ENCOUNTER — OFFICE VISIT (OUTPATIENT)
Dept: FAMILY MEDICINE | Facility: CLINIC | Age: 43
End: 2018-10-25
Payer: COMMERCIAL

## 2018-10-25 ENCOUNTER — TELEPHONE (OUTPATIENT)
Dept: FAMILY MEDICINE | Facility: CLINIC | Age: 43
End: 2018-10-25

## 2018-10-25 VITALS
HEART RATE: 78 BPM | BODY MASS INDEX: 33.07 KG/M2 | SYSTOLIC BLOOD PRESSURE: 110 MMHG | HEIGHT: 74 IN | DIASTOLIC BLOOD PRESSURE: 68 MMHG | WEIGHT: 257.69 LBS | OXYGEN SATURATION: 97 %

## 2018-10-25 DIAGNOSIS — R79.89 ELEVATED LIVER FUNCTION TESTS: ICD-10-CM

## 2018-10-25 DIAGNOSIS — R79.89 ELEVATED LFTS: Primary | ICD-10-CM

## 2018-10-25 DIAGNOSIS — R79.89 ELEVATED LFTS: ICD-10-CM

## 2018-10-25 DIAGNOSIS — M25.521 RIGHT ELBOW PAIN: Primary | ICD-10-CM

## 2018-10-25 LAB
ALBUMIN SERPL BCP-MCNC: 3.8 G/DL
ALP SERPL-CCNC: 63 U/L
ALT SERPL W/O P-5'-P-CCNC: 104 U/L
ANION GAP SERPL CALC-SCNC: 7 MMOL/L
AST SERPL-CCNC: 44 U/L
BILIRUB SERPL-MCNC: 0.6 MG/DL
BUN SERPL-MCNC: 12 MG/DL
CALCIUM SERPL-MCNC: 9.6 MG/DL
CHLORIDE SERPL-SCNC: 105 MMOL/L
CO2 SERPL-SCNC: 28 MMOL/L
CREAT SERPL-MCNC: 0.9 MG/DL
EST. GFR  (AFRICAN AMERICAN): >60 ML/MIN/1.73 M^2
EST. GFR  (NON AFRICAN AMERICAN): >60 ML/MIN/1.73 M^2
FERRITIN SERPL-MCNC: 397 NG/ML
GLUCOSE SERPL-MCNC: 106 MG/DL
POTASSIUM SERPL-SCNC: 4.5 MMOL/L
PROT SERPL-MCNC: 7.3 G/DL
SODIUM SERPL-SCNC: 140 MMOL/L

## 2018-10-25 PROCEDURE — 80053 COMPREHEN METABOLIC PANEL: CPT

## 2018-10-25 PROCEDURE — 82728 ASSAY OF FERRITIN: CPT

## 2018-10-25 PROCEDURE — 3008F BODY MASS INDEX DOCD: CPT | Mod: CPTII,S$GLB,, | Performed by: FAMILY MEDICINE

## 2018-10-25 PROCEDURE — 36415 COLL VENOUS BLD VENIPUNCTURE: CPT | Mod: PO

## 2018-10-25 PROCEDURE — 99999 PR PBB SHADOW E&M-EST. PATIENT-LVL III: CPT | Mod: PBBFAC,,, | Performed by: FAMILY MEDICINE

## 2018-10-25 PROCEDURE — 99213 OFFICE O/P EST LOW 20 MIN: CPT | Mod: S$GLB,,, | Performed by: FAMILY MEDICINE

## 2018-10-25 NOTE — PROGRESS NOTES
Subjective:       Patient ID: Sergey Cabrera is a 43 y.o. male.    Chief Complaint: Follow-up (3 mos)    Sergey is a 43 y.o. male who presents today as a 3 month follow up.    His right elbow pain is resolving. The shooting pain is gone. He just has some intermittent pain with biceps workouts  Obesity: he has lost 7 pounds! He has been working out most morning, about 5 days a week. He does weight lifting and cardio mix.   Elevated LFT: he has a history of elevated LFT's. This was worked up in around 2008 which was all negative. He has gained weight unfortunately since then; at that time he has lost 20 pounds and his LFT's had apparently normalized.       Review of Systems   Constitutional: Negative for chills and fever.   Respiratory: Negative for cough and shortness of breath.    Cardiovascular: Negative for chest pain.   Gastrointestinal: Negative for abdominal pain, constipation, diarrhea, nausea and vomiting.   Genitourinary: Negative for difficulty urinating.   Skin: Negative for rash.         Objective:     Vitals:    10/25/18 0928   BP: 110/68   Pulse: 78        Physical Exam   Constitutional: He is oriented to person, place, and time. He appears well-developed and well-nourished.   HENT:   Head: Normocephalic and atraumatic.   Eyes: Conjunctivae are normal. Pupils are equal, round, and reactive to light.   Neck: Normal range of motion. Neck supple.   Cardiovascular: Normal rate and regular rhythm.   Pulmonary/Chest: Effort normal and breath sounds normal.   Abdominal: Soft. There is no hepatomegaly. There is no tenderness. There is no rigidity, no rebound, no guarding and no CVA tenderness.   obese   Musculoskeletal: He exhibits no edema.   Neurological: He is alert and oriented to person, place, and time.   Skin: Skin is warm. No rash noted.   Psychiatric: He has a normal mood and affect. His behavior is normal. Judgment and thought content normal.   Nursing note and vitals reviewed.      Assessment:        1. Right elbow pain    2. BMI 33.0-33.9,adult    3. Elevated liver function tests        Plan:         Right elbow pain  Resolved    BMI 33.0-33.9,adult  Has lost weight!    Elevated liver function tests  Extensive workup done in the past  Will recheck CMP and ferritin today  If continues to be elevated, may have to send to hepatology  Stressed importance of weight loss      Warning signs discussed, patient to call with any further issues or worsening of symptoms.

## 2018-10-25 NOTE — PATIENT INSTRUCTIONS
Liver Panel  Does this test have other names?  Liver function test (LFT); hepatic function test; liver function panel (LFP); Alb, Tbil, Dbil, Alk Phos, ALT, Tot Protein  What is this test?  This group of tests measures specific proteins and enzymes in your blood.  Your liver is the second largest organ in your body. It converts the food you eat into energy and nutrients and filters waste from your blood.  A liver panel checks the health of your liver and can help diagnose liver damage or disease. The panel consists of these tests:  · Albumin. Albumin is a protein made in the liver.  · Alkaline phosphatase (ALP). ALP is an enzyme found in high quantities in your liver, bile duct, and elsewhere in your body.  · Alanine transaminase (ALT). ALT is another enzyme found in your liver.  · Aspartate transaminase (AST). AST is another enzyme found in your liver, heart, and muscles.  · Total protein. Total protein measures the amount of protein in your blood. Globulin and albumin are the two main proteins found in the blood.  Liver panel or liver function tests also measure bilirubin, a waste product that forms when red blood cells break down. This test measures total bilirubin and direct bilirubin. Total bilirubin is the amount of bilirubin in your blood. Direct bilirubin measures the bilirubin that is made in your liver.    Why do I need this test?  You may have this test as part of a routine checkup. You may also have this test if you have symptoms of liver disease or damage. Symptoms include:  · Yellow color of your skin and the whites of your eyes (jaundice)  · Dark-colored urine  · Light-colored or black and bloody bowel movements  · Nausea or vomiting  · Diarrhea  · Loss of appetite  · Abdominal swelling or pain  · Weight loss or gain  · Fatigue  You also might have this test if you've been exposed to a hepatitis virus, have a family history of liver disease, drink excessive amounts of alcohol, or take medicines that  can cause liver damage.   You may also need this test to help your healthcare provider watch the progression of a virus like hepatitis that affects the liver or liver damage caused by alcohol.  What other tests might I have along with this test?  Your healthcare provider may also order these tests:  · Gamma-glutamyl transpeptidase (GGT). GGT is another enzyme found in large amounts in your liver, bile ducts, and pancreas.  · Prothrombin time. Prothrombin is protein made in your liver. It helps blood to clot. A prothrombin time test measures how long it takes your blood to clot.  What do my test results mean?  Many things may affect your lab test results. These include the method each lab uses to do the test. Even if your test results are different from the normal value, you may not have a problem. To learn what the results mean for you, talk with your health are provider.  Normal ranges for liver function tests vary depending on various factors, including your age and gender.  Results are given in grams per deciliter (g/dL), milligrams per deciliter (mg/dL), units per liter (U/L), or international units per liter (IU/L). Here is an example of normal findings for adults:  · Albumin: 3.5 to 5 g/dL  · Total bilirubin: 0.3 to 1.0 mg/dL  · Direct bilirubin: 0.1 to 0.3 mg/dL  · ALP: 30 to 120 U/L  · ALT: 4 to 36 IU/L  · AST: 0 to 35 U/L  · Total protein: 6.4 to 8.3 g/dL  High levels of these proteins and enzymes may mean that your liver or bile duct is damaged or diseased. Overall, a pattern of abnormalities among the results may be more significant than the individual test results. But levels of ALT are useful for finding out whether you have hepatitis.  Diseases unrelated to the liver can cause abnormal test results. Some people with advanced liver disease may have normal test results.  How is this test done?  The test requires a blood sample, which is drawn through a needle from a vein in your arm.  Does this test pose  any risks?  Taking a blood sample with a needle carries risks that include bleeding, infection, bruising, or feeling dizzy. When the needle pricks your arm, you may feel a slight stinging sensation or pain. Afterward, the site may be slightly sore.  What might affect my test results?  Your results can be affected by:  · Viruses  · How much alcohol you drink  · Certain medicines  · Obesity  How do I get ready for this test?  You don't need to prepare for this test. But the test may be more accurate if you fast for 10 to 12 hours beforehand. Be sure your healthcare provider knows about all medicines, herbs, vitamins, and supplements you are taking. This includes medicines that don't need a prescription and any illicit drugs you may use.    © 9270-9562 [employer]. [address] [place] [address], [place] [address]. All rights reserved. This information is not intended as a substitute for professional medical care. Always follow your healthcare professional's instructions.

## 2018-10-26 NOTE — TELEPHONE ENCOUNTER
Please call patient and tell him to check his portal. Message sent via portal is below.     Please schedule his repeat CMP in 6 weeks.       Your liver function tests are improving but still significantly elevated. I have ordered a repeat in 6 weeks. If they continue trending down, I will continue to monitor them. If they are still >2x elevated or trending up, you will have to see hepatology. My staff will be calling you to confirm that you get this message and to schedule your blood work. Please contact me with any questions.

## 2020-10-05 ENCOUNTER — PATIENT MESSAGE (OUTPATIENT)
Dept: ADMINISTRATIVE | Facility: HOSPITAL | Age: 45
End: 2020-10-05

## 2021-01-05 ENCOUNTER — PATIENT MESSAGE (OUTPATIENT)
Dept: ADMINISTRATIVE | Facility: HOSPITAL | Age: 46
End: 2021-01-05

## 2021-04-05 ENCOUNTER — PATIENT MESSAGE (OUTPATIENT)
Dept: ADMINISTRATIVE | Facility: HOSPITAL | Age: 46
End: 2021-04-05

## 2021-04-15 ENCOUNTER — PATIENT MESSAGE (OUTPATIENT)
Dept: RESEARCH | Facility: HOSPITAL | Age: 46
End: 2021-04-15

## 2021-05-06 ENCOUNTER — OFFICE VISIT (OUTPATIENT)
Dept: FAMILY MEDICINE | Facility: CLINIC | Age: 46
End: 2021-05-06
Payer: COMMERCIAL

## 2021-05-06 ENCOUNTER — HOSPITAL ENCOUNTER (OUTPATIENT)
Dept: RADIOLOGY | Facility: HOSPITAL | Age: 46
Discharge: HOME | End: 2021-05-06
Attending: FAMILY MEDICINE
Payer: COMMERCIAL

## 2021-05-06 ENCOUNTER — PATIENT MESSAGE (OUTPATIENT)
Dept: FAMILY MEDICINE | Facility: CLINIC | Age: 46
End: 2021-05-06

## 2021-05-06 VITALS
OXYGEN SATURATION: 97 % | DIASTOLIC BLOOD PRESSURE: 90 MMHG | BODY MASS INDEX: 34.43 KG/M2 | HEIGHT: 74 IN | WEIGHT: 268.31 LBS | HEART RATE: 85 BPM | SYSTOLIC BLOOD PRESSURE: 130 MMHG

## 2021-05-06 DIAGNOSIS — R06.09 EXERTIONAL DYSPNEA: ICD-10-CM

## 2021-05-06 DIAGNOSIS — Z00.00 VISIT FOR WELL MAN HEALTH CHECK: Primary | ICD-10-CM

## 2021-05-06 DIAGNOSIS — R03.0 ELEVATED BLOOD PRESSURE READING: ICD-10-CM

## 2021-05-06 DIAGNOSIS — Z86.16 PERSONAL HISTORY OF COVID-19: ICD-10-CM

## 2021-05-06 DIAGNOSIS — R00.2 PALPITATIONS: ICD-10-CM

## 2021-05-06 DIAGNOSIS — R35.1 NOCTURIA: ICD-10-CM

## 2021-05-06 DIAGNOSIS — R53.83 OTHER FATIGUE: ICD-10-CM

## 2021-05-06 DIAGNOSIS — R06.83 SNORING: ICD-10-CM

## 2021-05-06 DIAGNOSIS — I49.1 PAC (PREMATURE ATRIAL CONTRACTION): ICD-10-CM

## 2021-05-06 PROBLEM — M25.521 RIGHT ELBOW PAIN: Status: RESOLVED | Noted: 2018-07-23 | Resolved: 2021-05-06

## 2021-05-06 PROBLEM — M25.521 PAIN IN RIGHT ELBOW: Status: RESOLVED | Noted: 2018-08-20 | Resolved: 2021-05-06

## 2021-05-06 PROCEDURE — 3008F BODY MASS INDEX DOCD: CPT | Mod: CPTII,S$GLB,, | Performed by: FAMILY MEDICINE

## 2021-05-06 PROCEDURE — 99396 PR PREVENTIVE VISIT,EST,40-64: ICD-10-PCS | Mod: S$GLB,,, | Performed by: FAMILY MEDICINE

## 2021-05-06 PROCEDURE — 3008F PR BODY MASS INDEX (BMI) DOCUMENTED: ICD-10-PCS | Mod: CPTII,S$GLB,, | Performed by: FAMILY MEDICINE

## 2021-05-06 PROCEDURE — 99212 PR OFFICE/OUTPT VISIT, EST, LEVL II, 10-19 MIN: ICD-10-PCS | Mod: 25,S$GLB,, | Performed by: FAMILY MEDICINE

## 2021-05-06 PROCEDURE — 99999 PR PBB SHADOW E&M-EST. PATIENT-LVL IV: CPT | Mod: PBBFAC,,, | Performed by: FAMILY MEDICINE

## 2021-05-06 PROCEDURE — 99396 PREV VISIT EST AGE 40-64: CPT | Mod: S$GLB,,, | Performed by: FAMILY MEDICINE

## 2021-05-06 PROCEDURE — 99212 OFFICE O/P EST SF 10 MIN: CPT | Mod: 25,S$GLB,, | Performed by: FAMILY MEDICINE

## 2021-05-06 PROCEDURE — 93005 EKG 12-LEAD: ICD-10-PCS | Mod: S$GLB,,, | Performed by: FAMILY MEDICINE

## 2021-05-06 PROCEDURE — 93010 ELECTROCARDIOGRAM REPORT: CPT | Mod: S$GLB,,, | Performed by: INTERNAL MEDICINE

## 2021-05-06 PROCEDURE — 99999 PR PBB SHADOW E&M-EST. PATIENT-LVL IV: ICD-10-PCS | Mod: PBBFAC,,, | Performed by: FAMILY MEDICINE

## 2021-05-06 PROCEDURE — 71046 X-RAY EXAM CHEST 2 VIEWS: CPT | Mod: 26,,, | Performed by: RADIOLOGY

## 2021-05-06 PROCEDURE — 93005 ELECTROCARDIOGRAM TRACING: CPT | Mod: S$GLB,,, | Performed by: FAMILY MEDICINE

## 2021-05-06 PROCEDURE — 93010 EKG 12-LEAD: ICD-10-PCS | Mod: S$GLB,,, | Performed by: INTERNAL MEDICINE

## 2021-05-06 PROCEDURE — 71046 X-RAY EXAM CHEST 2 VIEWS: CPT | Mod: TC,FY,PO

## 2021-05-06 PROCEDURE — 71046 XR CHEST PA AND LATERAL: ICD-10-PCS | Mod: 26,,, | Performed by: RADIOLOGY

## 2021-05-07 ENCOUNTER — TELEPHONE (OUTPATIENT)
Dept: FAMILY MEDICINE | Facility: CLINIC | Age: 46
End: 2021-05-07

## 2021-05-07 DIAGNOSIS — R79.89 ELEVATED LIVER FUNCTION TESTS: Primary | ICD-10-CM

## 2021-05-07 DIAGNOSIS — R31.21 ASYMPTOMATIC MICROSCOPIC HEMATURIA: ICD-10-CM

## 2021-05-10 ENCOUNTER — TELEPHONE (OUTPATIENT)
Dept: SLEEP MEDICINE | Facility: CLINIC | Age: 46
End: 2021-05-10

## 2021-05-12 ENCOUNTER — OFFICE VISIT (OUTPATIENT)
Dept: SLEEP MEDICINE | Facility: CLINIC | Age: 46
End: 2021-05-12
Payer: COMMERCIAL

## 2021-05-12 VITALS
BODY MASS INDEX: 34.27 KG/M2 | WEIGHT: 267 LBS | HEART RATE: 78 BPM | HEIGHT: 74 IN | DIASTOLIC BLOOD PRESSURE: 83 MMHG | SYSTOLIC BLOOD PRESSURE: 114 MMHG

## 2021-05-12 DIAGNOSIS — R06.83 SNORING: ICD-10-CM

## 2021-05-12 DIAGNOSIS — G47.30 SLEEP APNEA, UNSPECIFIED TYPE: Primary | ICD-10-CM

## 2021-05-12 PROCEDURE — 99204 PR OFFICE/OUTPT VISIT, NEW, LEVL IV, 45-59 MIN: ICD-10-PCS | Mod: S$GLB,,, | Performed by: PSYCHIATRY & NEUROLOGY

## 2021-05-12 PROCEDURE — 1126F PR PAIN SEVERITY QUANTIFIED, NO PAIN PRESENT: ICD-10-PCS | Mod: S$GLB,,, | Performed by: PSYCHIATRY & NEUROLOGY

## 2021-05-12 PROCEDURE — 99999 PR PBB SHADOW E&M-EST. PATIENT-LVL III: ICD-10-PCS | Mod: PBBFAC,,, | Performed by: PSYCHIATRY & NEUROLOGY

## 2021-05-12 PROCEDURE — 99204 OFFICE O/P NEW MOD 45 MIN: CPT | Mod: S$GLB,,, | Performed by: PSYCHIATRY & NEUROLOGY

## 2021-05-12 PROCEDURE — 99999 PR PBB SHADOW E&M-EST. PATIENT-LVL III: CPT | Mod: PBBFAC,,, | Performed by: PSYCHIATRY & NEUROLOGY

## 2021-05-12 PROCEDURE — 3008F BODY MASS INDEX DOCD: CPT | Mod: CPTII,S$GLB,, | Performed by: PSYCHIATRY & NEUROLOGY

## 2021-05-12 PROCEDURE — 3008F PR BODY MASS INDEX (BMI) DOCUMENTED: ICD-10-PCS | Mod: CPTII,S$GLB,, | Performed by: PSYCHIATRY & NEUROLOGY

## 2021-05-12 PROCEDURE — 1126F AMNT PAIN NOTED NONE PRSNT: CPT | Mod: S$GLB,,, | Performed by: PSYCHIATRY & NEUROLOGY

## 2021-05-13 ENCOUNTER — HOSPITAL ENCOUNTER (OUTPATIENT)
Dept: CARDIOLOGY | Facility: HOSPITAL | Age: 46
Discharge: HOME | End: 2021-05-13
Attending: FAMILY MEDICINE
Payer: COMMERCIAL

## 2021-05-13 VITALS — BODY MASS INDEX: 34.27 KG/M2 | HEIGHT: 74 IN | WEIGHT: 267 LBS

## 2021-05-13 DIAGNOSIS — I49.1 PAC (PREMATURE ATRIAL CONTRACTION): ICD-10-CM

## 2021-05-13 DIAGNOSIS — R06.09 EXERTIONAL DYSPNEA: ICD-10-CM

## 2021-05-13 DIAGNOSIS — R00.2 PALPITATIONS: ICD-10-CM

## 2021-05-13 LAB
AORTIC ROOT ANNULUS: 3.06 CM
ASCENDING AORTA: 3.17 CM
AV INDEX (PROSTH): 0.92
AV MEAN GRADIENT: 4 MMHG
AV PEAK GRADIENT: 6 MMHG
AV VALVE AREA: 4.94 CM2
AV VELOCITY RATIO: 1
BSA FOR ECHO PROCEDURE: 2.51 M2
CV ECHO LV RWT: 0.34 CM
DOP CALC AO PEAK VEL: 1.18 M/S
DOP CALC AO VTI: 19.97 CM
DOP CALC LVOT AREA: 5.4 CM2
DOP CALC LVOT DIAMETER: 2.62 CM
DOP CALC LVOT PEAK VEL: 1.18 M/S
DOP CALC LVOT STROKE VOLUME: 98.66 CM3
DOP CALCLVOT PEAK VEL VTI: 18.31 CM
E WAVE DECELERATION TIME: 189.36 MSEC
E/A RATIO: 1.26
E/E' RATIO: 7.44 M/S
ECHO LV POSTERIOR WALL: 0.74 CM (ref 0.6–1.1)
EJECTION FRACTION: 55 %
FRACTIONAL SHORTENING: 49 % (ref 28–44)
INTERVENTRICULAR SEPTUM: 0.7 CM (ref 0.6–1.1)
IVRT: 81.83 MSEC
LA MAJOR: 5 CM
LA MINOR: 4.6 CM
LA WIDTH: 2.7 CM
LEFT ATRIUM SIZE: 3.58 CM
LEFT ATRIUM VOLUME INDEX MOD: 20.5 ML/M2
LEFT ATRIUM VOLUME INDEX: 16 ML/M2
LEFT ATRIUM VOLUME MOD: 50.51 CM3
LEFT ATRIUM VOLUME: 39.37 CM3
LEFT INTERNAL DIMENSION IN SYSTOLE: 2.2 CM (ref 2.1–4)
LEFT VENTRICLE DIASTOLIC VOLUME INDEX: 76.75 ML/M2
LEFT VENTRICLE DIASTOLIC VOLUME: 188.81 ML
LEFT VENTRICLE MASS INDEX: 37 G/M2
LEFT VENTRICLE SYSTOLIC VOLUME INDEX: 28.4 ML/M2
LEFT VENTRICLE SYSTOLIC VOLUME: 69.85 ML
LEFT VENTRICULAR INTERNAL DIMENSION IN DIASTOLE: 4.3 CM (ref 3.5–6)
LEFT VENTRICULAR MASS: 91.8 G
LV LATERAL E/E' RATIO: 6.7 M/S
LV SEPTAL E/E' RATIO: 8.38 M/S
MV A" WAVE DURATION": 10.56 MSEC
MV PEAK A VEL: 0.53 M/S
MV PEAK E VEL: 0.67 M/S
MV PEAK GRADIENT: 3 MMHG
MV STENOSIS PRESSURE HALF TIME: 54.91 MS
MV VALVE AREA P 1/2 METHOD: 4.01 CM2
PISA MRMAX VEL: 0.06 M/S
PISA TR MAX VEL: 1.97 M/S
PULM VEIN S/D RATIO: 0.97
PV PEAK D VEL: 0.36 M/S
PV PEAK S VEL: 0.35 M/S
PV PEAK VELOCITY: 0.88 CM/S
RA MAJOR: 4 CM
RA PRESSURE: 3 MMHG
RA WIDTH: 3.82 CM
RIGHT VENTRICULAR END-DIASTOLIC DIMENSION: 2.62 CM
RIGHT VENTRICULAR LENGTH IN DIASTOLE (APICAL 4-CHAMBER VIEW): 6.5 CM
RV TISSUE DOPPLER FREE WALL SYSTOLIC VELOCITY 1 (APICAL 4 CHAMBER VIEW): 13.02 CM/S
STJ: 2.96 CM
TDI LATERAL: 0.1 M/S
TDI SEPTAL: 0.08 M/S
TDI: 0.09 M/S
TR MAX PG: 16 MMHG
TRICUSPID ANNULAR PLANE SYSTOLIC EXCURSION: 1.8 CM
TV REST PULMONARY ARTERY PRESSURE: 19 MMHG

## 2021-05-13 PROCEDURE — 93227 XTRNL ECG REC<48 HR R&I: CPT | Mod: ,,, | Performed by: INTERNAL MEDICINE

## 2021-05-13 PROCEDURE — 93306 ECHO (CUPID ONLY): ICD-10-PCS | Mod: 26,,, | Performed by: INTERNAL MEDICINE

## 2021-05-13 PROCEDURE — 93306 TTE W/DOPPLER COMPLETE: CPT | Mod: 26,,, | Performed by: INTERNAL MEDICINE

## 2021-05-13 PROCEDURE — 93306 TTE W/DOPPLER COMPLETE: CPT

## 2021-05-13 PROCEDURE — 93225 XTRNL ECG REC<48 HRS REC: CPT

## 2021-05-13 PROCEDURE — 93227 HOLTER MONITOR - 48 HOUR (CUPID ONLY): ICD-10-PCS | Mod: ,,, | Performed by: INTERNAL MEDICINE

## 2021-05-14 ENCOUNTER — TELEPHONE (OUTPATIENT)
Dept: SLEEP MEDICINE | Facility: OTHER | Age: 46
End: 2021-05-14

## 2021-05-17 DIAGNOSIS — I49.3 FREQUENT PVCS: Primary | ICD-10-CM

## 2021-05-17 LAB
OHS CV EVENT MONITOR DAY: 0
OHS CV HOLTER LENGTH DECIMAL HOURS: 47.98
OHS CV HOLTER LENGTH HOURS: 47
OHS CV HOLTER LENGTH MINUTES: 59

## 2021-05-17 RX ORDER — METOPROLOL TARTRATE 25 MG/1
25 TABLET, FILM COATED ORAL 2 TIMES DAILY
Qty: 180 TABLET | Refills: 0 | Status: SHIPPED | OUTPATIENT
Start: 2021-05-17 | End: 2021-08-09

## 2021-05-18 ENCOUNTER — PATIENT MESSAGE (OUTPATIENT)
Dept: FAMILY MEDICINE | Facility: CLINIC | Age: 46
End: 2021-05-18

## 2021-05-31 ENCOUNTER — OFFICE VISIT (OUTPATIENT)
Dept: CARDIOLOGY | Facility: CLINIC | Age: 46
End: 2021-05-31
Payer: COMMERCIAL

## 2021-05-31 ENCOUNTER — DOCUMENTATION ONLY (OUTPATIENT)
Dept: TRANSPLANT | Facility: CLINIC | Age: 46
End: 2021-05-31

## 2021-05-31 ENCOUNTER — HOSPITAL ENCOUNTER (OUTPATIENT)
Dept: RADIOLOGY | Facility: HOSPITAL | Age: 46
Discharge: HOME | End: 2021-05-31
Attending: FAMILY MEDICINE
Payer: COMMERCIAL

## 2021-05-31 VITALS
DIASTOLIC BLOOD PRESSURE: 64 MMHG | BODY MASS INDEX: 34.55 KG/M2 | OXYGEN SATURATION: 98 % | HEART RATE: 80 BPM | HEIGHT: 74 IN | WEIGHT: 269.19 LBS | SYSTOLIC BLOOD PRESSURE: 126 MMHG

## 2021-05-31 DIAGNOSIS — R79.89 ELEVATED LIVER FUNCTION TESTS: ICD-10-CM

## 2021-05-31 DIAGNOSIS — R00.2 PALPITATIONS: Primary | ICD-10-CM

## 2021-05-31 DIAGNOSIS — R06.09 EXERTIONAL DYSPNEA: ICD-10-CM

## 2021-05-31 DIAGNOSIS — K76.0 HEPATIC STEATOSIS: Primary | ICD-10-CM

## 2021-05-31 DIAGNOSIS — I49.1 PAC (PREMATURE ATRIAL CONTRACTION): ICD-10-CM

## 2021-05-31 DIAGNOSIS — R06.09 DOE (DYSPNEA ON EXERTION): ICD-10-CM

## 2021-05-31 DIAGNOSIS — E66.9 OBESITY, UNSPECIFIED CLASSIFICATION, UNSPECIFIED OBESITY TYPE, UNSPECIFIED WHETHER SERIOUS COMORBIDITY PRESENT: ICD-10-CM

## 2021-05-31 PROCEDURE — 76705 ECHO EXAM OF ABDOMEN: CPT | Mod: TC

## 2021-05-31 PROCEDURE — 76705 ECHO EXAM OF ABDOMEN: CPT | Mod: 26,,, | Performed by: RADIOLOGY

## 2021-05-31 PROCEDURE — 99999 PR PBB SHADOW E&M-EST. PATIENT-LVL III: CPT | Mod: PBBFAC,,, | Performed by: INTERNAL MEDICINE

## 2021-05-31 PROCEDURE — 99999 PR PBB SHADOW E&M-EST. PATIENT-LVL III: ICD-10-PCS | Mod: PBBFAC,,, | Performed by: INTERNAL MEDICINE

## 2021-05-31 PROCEDURE — 99204 OFFICE O/P NEW MOD 45 MIN: CPT | Mod: S$GLB,,, | Performed by: INTERNAL MEDICINE

## 2021-05-31 PROCEDURE — 99204 PR OFFICE/OUTPT VISIT, NEW, LEVL IV, 45-59 MIN: ICD-10-PCS | Mod: S$GLB,,, | Performed by: INTERNAL MEDICINE

## 2021-05-31 PROCEDURE — 76705 US ABDOMEN LIMITED: ICD-10-PCS | Mod: 26,,, | Performed by: RADIOLOGY

## 2021-06-02 ENCOUNTER — OFFICE VISIT (OUTPATIENT)
Dept: HEPATOLOGY | Facility: CLINIC | Age: 46
End: 2021-06-02
Payer: COMMERCIAL

## 2021-06-02 VITALS
SYSTOLIC BLOOD PRESSURE: 117 MMHG | HEART RATE: 67 BPM | RESPIRATION RATE: 20 BRPM | DIASTOLIC BLOOD PRESSURE: 82 MMHG | BODY MASS INDEX: 34.32 KG/M2 | TEMPERATURE: 98 F | HEIGHT: 74 IN | WEIGHT: 267.44 LBS | OXYGEN SATURATION: 96 %

## 2021-06-02 DIAGNOSIS — R74.8 ELEVATED LIVER ENZYMES: ICD-10-CM

## 2021-06-02 DIAGNOSIS — K76.0 HEPATIC STEATOSIS: Primary | ICD-10-CM

## 2021-06-02 DIAGNOSIS — E78.2 HYPERCHOLESTEROLEMIA WITH HYPERTRIGLYCERIDEMIA: ICD-10-CM

## 2021-06-02 DIAGNOSIS — R16.0 HEPATOMEGALY: ICD-10-CM

## 2021-06-02 DIAGNOSIS — E66.9 OBESITY (BMI 30.0-34.9): ICD-10-CM

## 2021-06-02 PROCEDURE — 1126F AMNT PAIN NOTED NONE PRSNT: CPT | Mod: S$GLB,,, | Performed by: NURSE PRACTITIONER

## 2021-06-02 PROCEDURE — 99203 PR OFFICE/OUTPT VISIT, NEW, LEVL III, 30-44 MIN: ICD-10-PCS | Mod: S$GLB,,, | Performed by: NURSE PRACTITIONER

## 2021-06-02 PROCEDURE — 1126F PR PAIN SEVERITY QUANTIFIED, NO PAIN PRESENT: ICD-10-PCS | Mod: S$GLB,,, | Performed by: NURSE PRACTITIONER

## 2021-06-02 PROCEDURE — 3008F BODY MASS INDEX DOCD: CPT | Mod: CPTII,S$GLB,, | Performed by: NURSE PRACTITIONER

## 2021-06-02 PROCEDURE — 99999 PR PBB SHADOW E&M-EST. PATIENT-LVL V: ICD-10-PCS | Mod: PBBFAC,,, | Performed by: NURSE PRACTITIONER

## 2021-06-02 PROCEDURE — 3008F PR BODY MASS INDEX (BMI) DOCUMENTED: ICD-10-PCS | Mod: CPTII,S$GLB,, | Performed by: NURSE PRACTITIONER

## 2021-06-02 PROCEDURE — 99999 PR PBB SHADOW E&M-EST. PATIENT-LVL V: CPT | Mod: PBBFAC,,, | Performed by: NURSE PRACTITIONER

## 2021-06-02 PROCEDURE — 99203 OFFICE O/P NEW LOW 30 MIN: CPT | Mod: S$GLB,,, | Performed by: NURSE PRACTITIONER

## 2021-06-03 ENCOUNTER — TELEPHONE (OUTPATIENT)
Dept: HEPATOLOGY | Facility: CLINIC | Age: 46
End: 2021-06-03

## 2021-06-03 ENCOUNTER — PROCEDURE VISIT (OUTPATIENT)
Dept: HEPATOLOGY | Facility: CLINIC | Age: 46
End: 2021-06-03
Payer: COMMERCIAL

## 2021-06-03 ENCOUNTER — PATIENT MESSAGE (OUTPATIENT)
Dept: HEPATOLOGY | Facility: CLINIC | Age: 46
End: 2021-06-03

## 2021-06-03 DIAGNOSIS — R74.8 ELEVATED LIVER ENZYMES: ICD-10-CM

## 2021-06-03 DIAGNOSIS — K76.0 HEPATIC STEATOSIS: Primary | ICD-10-CM

## 2021-06-03 DIAGNOSIS — R16.0 HEPATOMEGALY: Primary | ICD-10-CM

## 2021-06-03 DIAGNOSIS — K74.00 LIVER FIBROSIS: ICD-10-CM

## 2021-06-03 PROCEDURE — 91200 LIVER ELASTOGRAPHY: CPT | Mod: S$GLB,,, | Performed by: NURSE PRACTITIONER

## 2021-06-03 PROCEDURE — 91200 FIBROSCAN (VIBRATION CONTROLLED TRANSIENT ELASTOGRAPHY): ICD-10-PCS | Mod: S$GLB,,, | Performed by: NURSE PRACTITIONER

## 2021-06-07 ENCOUNTER — OFFICE VISIT (OUTPATIENT)
Dept: URGENT CARE | Facility: CLINIC | Age: 46
End: 2021-06-07
Payer: COMMERCIAL

## 2021-06-09 ENCOUNTER — TELEPHONE (OUTPATIENT)
Dept: SLEEP MEDICINE | Facility: OTHER | Age: 46
End: 2021-06-09

## 2021-06-10 ENCOUNTER — HOSPITAL ENCOUNTER (OUTPATIENT)
Dept: SLEEP MEDICINE | Facility: OTHER | Age: 46
Discharge: HOME | End: 2021-06-10
Attending: PSYCHIATRY & NEUROLOGY
Payer: COMMERCIAL

## 2021-06-10 DIAGNOSIS — G47.33 OSA (OBSTRUCTIVE SLEEP APNEA): ICD-10-CM

## 2021-06-10 DIAGNOSIS — G47.30 SLEEP APNEA, UNSPECIFIED TYPE: ICD-10-CM

## 2021-06-10 PROCEDURE — 95800 SLP STDY UNATTENDED: CPT | Mod: 26,,, | Performed by: PSYCHIATRY & NEUROLOGY

## 2021-06-10 PROCEDURE — 95800 SLP STDY UNATTENDED: CPT

## 2021-06-10 PROCEDURE — 95800 PR SLEEP STUDY, UNATTENDED, RECORD HEART RATE/O2 SAT/RESP ANAL/SLEEP TIME: ICD-10-PCS | Mod: 26,,, | Performed by: PSYCHIATRY & NEUROLOGY

## 2021-06-16 ENCOUNTER — PATIENT MESSAGE (OUTPATIENT)
Dept: SLEEP MEDICINE | Facility: CLINIC | Age: 46
End: 2021-06-16

## 2021-06-16 ENCOUNTER — TELEPHONE (OUTPATIENT)
Dept: SLEEP MEDICINE | Facility: CLINIC | Age: 46
End: 2021-06-16

## 2021-06-16 DIAGNOSIS — G47.33 OSA (OBSTRUCTIVE SLEEP APNEA): Primary | ICD-10-CM

## 2021-06-17 ENCOUNTER — TELEPHONE (OUTPATIENT)
Dept: SLEEP MEDICINE | Facility: CLINIC | Age: 46
End: 2021-06-17

## 2021-07-01 ENCOUNTER — HOSPITAL ENCOUNTER (OUTPATIENT)
Dept: CARDIOLOGY | Facility: HOSPITAL | Age: 46
Discharge: HOME | End: 2021-07-01
Attending: INTERNAL MEDICINE
Payer: COMMERCIAL

## 2021-07-01 ENCOUNTER — PATIENT MESSAGE (OUTPATIENT)
Dept: SLEEP MEDICINE | Facility: CLINIC | Age: 46
End: 2021-07-01

## 2021-07-01 ENCOUNTER — TELEPHONE (OUTPATIENT)
Dept: SLEEP MEDICINE | Facility: CLINIC | Age: 46
End: 2021-07-01

## 2021-07-01 DIAGNOSIS — R00.2 PALPITATIONS: ICD-10-CM

## 2021-07-01 DIAGNOSIS — G47.33 OSA (OBSTRUCTIVE SLEEP APNEA): Primary | ICD-10-CM

## 2021-07-01 PROCEDURE — 93226 XTRNL ECG REC<48 HR SCAN A/R: CPT

## 2021-07-01 PROCEDURE — 93227 HOLTER MONITOR - 24 HOUR (CUPID ONLY): ICD-10-PCS | Mod: ,,, | Performed by: INTERNAL MEDICINE

## 2021-07-01 PROCEDURE — 93227 XTRNL ECG REC<48 HR R&I: CPT | Mod: ,,, | Performed by: INTERNAL MEDICINE

## 2021-07-05 LAB
OHS CV EVENT MONITOR DAY: 0
OHS CV HOLTER LENGTH DECIMAL HOURS: 23.98
OHS CV HOLTER LENGTH HOURS: 23
OHS CV HOLTER LENGTH MINUTES: 59

## 2021-07-12 ENCOUNTER — TELEPHONE (OUTPATIENT)
Dept: SLEEP MEDICINE | Facility: CLINIC | Age: 46
End: 2021-07-12

## 2021-07-12 DIAGNOSIS — G47.33 OSA (OBSTRUCTIVE SLEEP APNEA): Primary | ICD-10-CM

## 2021-10-01 DIAGNOSIS — I49.3 FREQUENT PVCS: ICD-10-CM

## 2021-10-01 RX ORDER — METOPROLOL TARTRATE 25 MG/1
25 TABLET, FILM COATED ORAL 2 TIMES DAILY
Qty: 180 TABLET | Refills: 0 | Status: SHIPPED | OUTPATIENT
Start: 2021-10-01 | End: 2022-04-25

## 2021-10-20 ENCOUNTER — TELEPHONE (OUTPATIENT)
Dept: CARDIOLOGY | Facility: CLINIC | Age: 46
End: 2021-10-20

## 2021-11-04 ENCOUNTER — HOSPITAL ENCOUNTER (EMERGENCY)
Facility: HOSPITAL | Age: 46
Discharge: HOME | End: 2021-11-04
Attending: EMERGENCY MEDICINE
Payer: COMMERCIAL

## 2021-11-04 VITALS
RESPIRATION RATE: 13 BRPM | BODY MASS INDEX: 35.29 KG/M2 | TEMPERATURE: 98 F | WEIGHT: 275 LBS | DIASTOLIC BLOOD PRESSURE: 68 MMHG | HEART RATE: 62 BPM | SYSTOLIC BLOOD PRESSURE: 112 MMHG | HEIGHT: 74 IN | OXYGEN SATURATION: 100 %

## 2021-11-04 DIAGNOSIS — R07.9 CHEST PAIN: ICD-10-CM

## 2021-11-04 DIAGNOSIS — R07.9 CHEST PAIN WITH LOW RISK FOR CARDIAC ETIOLOGY: Primary | ICD-10-CM

## 2021-11-04 LAB
ALBUMIN SERPL BCP-MCNC: 4.1 G/DL (ref 3.5–5.2)
ALP SERPL-CCNC: 58 U/L (ref 55–135)
ALT SERPL W/O P-5'-P-CCNC: 217 U/L (ref 10–44)
ANION GAP SERPL CALC-SCNC: 10 MMOL/L (ref 8–16)
AST SERPL-CCNC: 82 U/L (ref 10–40)
BASOPHILS # BLD AUTO: 0.01 K/UL (ref 0–0.2)
BASOPHILS NFR BLD: 0.1 % (ref 0–1.9)
BILIRUB SERPL-MCNC: 0.5 MG/DL (ref 0.1–1)
BNP SERPL-MCNC: <10 PG/ML (ref 0–99)
BUN SERPL-MCNC: 15 MG/DL (ref 6–20)
CALCIUM SERPL-MCNC: 9.8 MG/DL (ref 8.7–10.5)
CHLORIDE SERPL-SCNC: 105 MMOL/L (ref 95–110)
CO2 SERPL-SCNC: 25 MMOL/L (ref 23–29)
CREAT SERPL-MCNC: 0.9 MG/DL (ref 0.5–1.4)
DIFFERENTIAL METHOD: NORMAL
EOSINOPHIL # BLD AUTO: 0.1 K/UL (ref 0–0.5)
EOSINOPHIL NFR BLD: 1.8 % (ref 0–8)
ERYTHROCYTE [DISTWIDTH] IN BLOOD BY AUTOMATED COUNT: 12 % (ref 11.5–14.5)
EST. GFR  (AFRICAN AMERICAN): >60 ML/MIN/1.73 M^2
EST. GFR  (NON AFRICAN AMERICAN): >60 ML/MIN/1.73 M^2
GLUCOSE SERPL-MCNC: 93 MG/DL (ref 70–110)
HCT VFR BLD AUTO: 45.6 % (ref 40–54)
HGB BLD-MCNC: 15.7 G/DL (ref 14–18)
IMM GRANULOCYTES # BLD AUTO: 0.03 K/UL (ref 0–0.04)
IMM GRANULOCYTES NFR BLD AUTO: 0.4 % (ref 0–0.5)
LYMPHOCYTES # BLD AUTO: 2.6 K/UL (ref 1–4.8)
LYMPHOCYTES NFR BLD: 38 % (ref 18–48)
MCH RBC QN AUTO: 29.2 PG (ref 27–31)
MCHC RBC AUTO-ENTMCNC: 34.4 G/DL (ref 32–36)
MCV RBC AUTO: 85 FL (ref 82–98)
MONOCYTES # BLD AUTO: 0.5 K/UL (ref 0.3–1)
MONOCYTES NFR BLD: 7.7 % (ref 4–15)
NEUTROPHILS # BLD AUTO: 3.6 K/UL (ref 1.8–7.7)
NEUTROPHILS NFR BLD: 52 % (ref 38–73)
NRBC BLD-RTO: 0 /100 WBC
PLATELET # BLD AUTO: 289 K/UL (ref 150–450)
PMV BLD AUTO: 9.2 FL (ref 9.2–12.9)
POTASSIUM SERPL-SCNC: 4.1 MMOL/L (ref 3.5–5.1)
PROT SERPL-MCNC: 8 G/DL (ref 6–8.4)
RBC # BLD AUTO: 5.38 M/UL (ref 4.6–6.2)
SODIUM SERPL-SCNC: 140 MMOL/L (ref 136–145)
TROPONIN I SERPL DL<=0.01 NG/ML-MCNC: <0.006 NG/ML (ref 0–0.03)
WBC # BLD AUTO: 6.84 K/UL (ref 3.9–12.7)

## 2021-11-04 PROCEDURE — 93010 EKG 12-LEAD: ICD-10-PCS | Mod: ,,, | Performed by: INTERNAL MEDICINE

## 2021-11-04 PROCEDURE — 93005 ELECTROCARDIOGRAM TRACING: CPT

## 2021-11-04 PROCEDURE — 80053 COMPREHEN METABOLIC PANEL: CPT | Performed by: PHYSICIAN ASSISTANT

## 2021-11-04 PROCEDURE — 25000003 PHARM REV CODE 250: Performed by: PHYSICIAN ASSISTANT

## 2021-11-04 PROCEDURE — 93010 ELECTROCARDIOGRAM REPORT: CPT | Mod: ,,, | Performed by: INTERNAL MEDICINE

## 2021-11-04 PROCEDURE — 63600175 PHARM REV CODE 636 W HCPCS: Performed by: EMERGENCY MEDICINE

## 2021-11-04 PROCEDURE — 25000003 PHARM REV CODE 250: Performed by: EMERGENCY MEDICINE

## 2021-11-04 PROCEDURE — 84484 ASSAY OF TROPONIN QUANT: CPT | Performed by: PHYSICIAN ASSISTANT

## 2021-11-04 PROCEDURE — 83880 ASSAY OF NATRIURETIC PEPTIDE: CPT | Performed by: EMERGENCY MEDICINE

## 2021-11-04 PROCEDURE — 85025 COMPLETE CBC W/AUTO DIFF WBC: CPT | Performed by: PHYSICIAN ASSISTANT

## 2021-11-04 PROCEDURE — 99285 EMERGENCY DEPT VISIT HI MDM: CPT | Mod: 25

## 2021-11-04 RX ORDER — IBUPROFEN 600 MG/1
600 TABLET ORAL
Status: COMPLETED | OUTPATIENT
Start: 2021-11-04 | End: 2021-11-04

## 2021-11-04 RX ORDER — ASPIRIN 325 MG
325 TABLET ORAL
Status: COMPLETED | OUTPATIENT
Start: 2021-11-04 | End: 2021-11-04

## 2021-11-04 RX ORDER — KETOROLAC TROMETHAMINE 30 MG/ML
30 INJECTION, SOLUTION INTRAMUSCULAR; INTRAVENOUS
Status: DISCONTINUED | OUTPATIENT
Start: 2021-11-04 | End: 2021-11-04

## 2021-11-04 RX ORDER — DEXAMETHASONE SODIUM PHOSPHATE 4 MG/ML
8 INJECTION, SOLUTION INTRA-ARTICULAR; INTRALESIONAL; INTRAMUSCULAR; INTRAVENOUS; SOFT TISSUE
Status: DISCONTINUED | OUTPATIENT
Start: 2021-11-04 | End: 2021-11-04

## 2021-11-04 RX ORDER — PREDNISONE 20 MG/1
60 TABLET ORAL
Status: COMPLETED | OUTPATIENT
Start: 2021-11-04 | End: 2021-11-04

## 2021-11-04 RX ADMIN — PREDNISONE 60 MG: 20 TABLET ORAL at 04:11

## 2021-11-04 RX ADMIN — IBUPROFEN 600 MG: 600 TABLET, FILM COATED ORAL at 04:11

## 2021-11-04 RX ADMIN — ASPIRIN 325 MG ORAL TABLET 325 MG: 325 PILL ORAL at 04:11

## 2021-12-02 ENCOUNTER — TELEPHONE (OUTPATIENT)
Dept: HEPATOLOGY | Facility: CLINIC | Age: 46
End: 2021-12-02
Payer: COMMERCIAL

## 2022-05-31 ENCOUNTER — PATIENT MESSAGE (OUTPATIENT)
Dept: ADMINISTRATIVE | Facility: HOSPITAL | Age: 47
End: 2022-05-31
Payer: COMMERCIAL

## 2023-09-01 ENCOUNTER — HOSPITAL ENCOUNTER (EMERGENCY)
Facility: HOSPITAL | Age: 48
Discharge: HOME | End: 2023-09-01
Attending: EMERGENCY MEDICINE
Payer: COMMERCIAL

## 2023-09-01 VITALS
TEMPERATURE: 98 F | HEART RATE: 69 BPM | HEIGHT: 74 IN | RESPIRATION RATE: 22 BRPM | OXYGEN SATURATION: 95 % | WEIGHT: 268 LBS | DIASTOLIC BLOOD PRESSURE: 71 MMHG | BODY MASS INDEX: 34.39 KG/M2 | SYSTOLIC BLOOD PRESSURE: 141 MMHG

## 2023-09-01 DIAGNOSIS — R07.9 CHEST PAIN: ICD-10-CM

## 2023-09-01 DIAGNOSIS — R07.89 LEFT-SIDED CHEST WALL PAIN: Primary | ICD-10-CM

## 2023-09-01 LAB
ALBUMIN SERPL BCP-MCNC: 4.3 G/DL (ref 3.5–5.2)
ALP SERPL-CCNC: 62 U/L (ref 55–135)
ALT SERPL W/O P-5'-P-CCNC: 129 U/L (ref 10–44)
ANION GAP SERPL CALC-SCNC: 10 MMOL/L (ref 8–16)
AST SERPL-CCNC: 69 U/L (ref 10–40)
BASOPHILS # BLD AUTO: 0.02 K/UL (ref 0–0.2)
BASOPHILS NFR BLD: 0.2 % (ref 0–1.9)
BILIRUB SERPL-MCNC: 0.5 MG/DL (ref 0.1–1)
BNP SERPL-MCNC: <10 PG/ML (ref 0–99)
BUN SERPL-MCNC: 12 MG/DL (ref 6–20)
CALCIUM SERPL-MCNC: 9.5 MG/DL (ref 8.7–10.5)
CHLORIDE SERPL-SCNC: 103 MMOL/L (ref 95–110)
CO2 SERPL-SCNC: 23 MMOL/L (ref 23–29)
CREAT SERPL-MCNC: 0.8 MG/DL (ref 0.5–1.4)
DIFFERENTIAL METHOD: NORMAL
EOSINOPHIL # BLD AUTO: 0.2 K/UL (ref 0–0.5)
EOSINOPHIL NFR BLD: 1.9 % (ref 0–8)
ERYTHROCYTE [DISTWIDTH] IN BLOOD BY AUTOMATED COUNT: 12.1 % (ref 11.5–14.5)
EST. GFR  (NO RACE VARIABLE): >60 ML/MIN/1.73 M^2
GLUCOSE SERPL-MCNC: 91 MG/DL (ref 70–110)
HCT VFR BLD AUTO: 45.4 % (ref 40–54)
HGB BLD-MCNC: 15.7 G/DL (ref 14–18)
IMM GRANULOCYTES # BLD AUTO: 0.04 K/UL (ref 0–0.04)
IMM GRANULOCYTES NFR BLD AUTO: 0.5 % (ref 0–0.5)
LYMPHOCYTES # BLD AUTO: 2.3 K/UL (ref 1–4.8)
LYMPHOCYTES NFR BLD: 28.8 % (ref 18–48)
MCH RBC QN AUTO: 28.8 PG (ref 27–31)
MCHC RBC AUTO-ENTMCNC: 34.6 G/DL (ref 32–36)
MCV RBC AUTO: 83 FL (ref 82–98)
MONOCYTES # BLD AUTO: 0.5 K/UL (ref 0.3–1)
MONOCYTES NFR BLD: 6.3 % (ref 4–15)
NEUTROPHILS # BLD AUTO: 5 K/UL (ref 1.8–7.7)
NEUTROPHILS NFR BLD: 62.3 % (ref 38–73)
NRBC BLD-RTO: 0 /100 WBC
PLATELET # BLD AUTO: 299 K/UL (ref 150–450)
PMV BLD AUTO: 9.6 FL (ref 9.2–12.9)
POTASSIUM SERPL-SCNC: 3.7 MMOL/L (ref 3.5–5.1)
PROT SERPL-MCNC: 7.8 G/DL (ref 6–8.4)
RBC # BLD AUTO: 5.46 M/UL (ref 4.6–6.2)
SODIUM SERPL-SCNC: 136 MMOL/L (ref 136–145)
TROPONIN I SERPL DL<=0.01 NG/ML-MCNC: <0.006 NG/ML (ref 0–0.03)
TROPONIN I SERPL DL<=0.01 NG/ML-MCNC: <0.006 NG/ML (ref 0–0.03)
WBC # BLD AUTO: 8.08 K/UL (ref 3.9–12.7)

## 2023-09-01 PROCEDURE — 93010 EKG 12-LEAD: ICD-10-PCS | Mod: ,,, | Performed by: INTERNAL MEDICINE

## 2023-09-01 PROCEDURE — 93005 ELECTROCARDIOGRAM TRACING: CPT

## 2023-09-01 PROCEDURE — 93010 ELECTROCARDIOGRAM REPORT: CPT | Mod: ,,, | Performed by: INTERNAL MEDICINE

## 2023-09-01 PROCEDURE — 84484 ASSAY OF TROPONIN QUANT: CPT | Mod: 91 | Performed by: NURSE PRACTITIONER

## 2023-09-01 PROCEDURE — 83880 ASSAY OF NATRIURETIC PEPTIDE: CPT | Performed by: NURSE PRACTITIONER

## 2023-09-01 PROCEDURE — 25000003 PHARM REV CODE 250: Performed by: NURSE PRACTITIONER

## 2023-09-01 PROCEDURE — 80053 COMPREHEN METABOLIC PANEL: CPT | Performed by: NURSE PRACTITIONER

## 2023-09-01 PROCEDURE — 85025 COMPLETE CBC W/AUTO DIFF WBC: CPT | Performed by: NURSE PRACTITIONER

## 2023-09-01 PROCEDURE — 99285 EMERGENCY DEPT VISIT HI MDM: CPT | Mod: 25

## 2023-09-01 PROCEDURE — 25000003 PHARM REV CODE 250: Performed by: EMERGENCY MEDICINE

## 2023-09-01 RX ORDER — ACETAMINOPHEN 500 MG
500 TABLET ORAL
Status: COMPLETED | OUTPATIENT
Start: 2023-09-01 | End: 2023-09-01

## 2023-09-01 RX ORDER — ASPIRIN 325 MG
325 TABLET ORAL
Status: COMPLETED | OUTPATIENT
Start: 2023-09-01 | End: 2023-09-01

## 2023-09-01 RX ADMIN — ASPIRIN 325 MG ORAL TABLET 325 MG: 325 PILL ORAL at 04:09

## 2023-09-01 RX ADMIN — ACETAMINOPHEN 500 MG: 500 TABLET ORAL at 06:09

## 2023-09-01 NOTE — ED PROVIDER NOTES
Encounter Date: 9/1/2023       History     Chief Complaint   Patient presents with    Chest Pain     Pt presents to the ed with chest pain and sob , non traumatic.      Patient is a 48-year-old male with a past medical history of Elevated liver enzymes and Fatty infiltration of liver who presents emergency room for chest pain with associated left arm pain that started yesterday around 12:00PM.  Patient states that he was in a meeting with his boss when he suddenly felt a sharp localized pain under left noel.  No associated nausea, vomiting, or diaphoresis.  Patient states pain lasted about 3 hours and gradually subsided.  Eventually it completely resolved and did not come back again until today around 1:00PM.  Pain exacerbated sometimes with movement, no alleviating factors.  He denies any prior injury or trauma to the area.  He denies any recent stressors.  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 Patient denies any significant past medical history or family history of heart problems.  He was on medication for heart palpitations 2 years ago that resolved.    The history is provided by the patient. No  was used.     Review of patient's allergies indicates:   Allergen Reactions    Grass pollen-ashleigh grass standard     Tree pollen-riddhi      Past Medical History:   Diagnosis Date    Elevated liver enzymes     Fatty infiltration of liver      Past Surgical History:   Procedure Laterality Date    TONSILLECTOMY       Family History   Problem Relation Age of Onset    No Known Problems Brother     Lupus Paternal Grandmother     Heart disease Paternal Grandmother     Heart attack Paternal Grandfather      Social History     Tobacco Use    Smoking status: Never    Smokeless tobacco: Never   Substance Use Topics    Alcohol use: Yes     Comment: Socially    Drug use: No     Review of Systems   Constitutional:  Negative for diaphoresis, fatigue and fever.   HENT:  Negative for  congestion and sore throat.    Respiratory:  Negative for cough and shortness of breath.    Cardiovascular:  Positive for chest pain. Negative for palpitations.   Gastrointestinal:  Negative for abdominal pain, constipation, diarrhea, nausea and vomiting.   Genitourinary:  Negative for difficulty urinating, dysuria and hematuria.   Musculoskeletal:  Negative for back pain, gait problem and myalgias.   Skin:  Negative for rash and wound.   Neurological:  Negative for weakness, light-headedness, numbness and headaches.       Physical Exam     Initial Vitals [09/01/23 1556]   BP Pulse Resp Temp SpO2   (!) 142/81 67 20 97.7 °F (36.5 °C) 100 %      MAP       --         Physical Exam    Nursing note and vitals reviewed.  Constitutional: He appears well-developed. He is not diaphoretic. He is Obese . No distress.   HENT:   Head: Normocephalic and atraumatic.   Right Ear: External ear normal.   Left Ear: External ear normal.   Eyes: Conjunctivae and EOM are normal. Pupils are equal, round, and reactive to light. No scleral icterus.   Neck: Neck supple.   Normal range of motion.  Cardiovascular:  Normal rate, regular rhythm and normal heart sounds.     Exam reveals no friction rub.       No murmur heard.  Pulmonary/Chest: Breath sounds normal. No respiratory distress. He has no wheezes. He has no rhonchi. He has no rales.   Abdominal: Abdomen is soft. Bowel sounds are normal. He exhibits no distension. There is no abdominal tenderness.   Musculoskeletal:         General: No tenderness or edema. Normal range of motion.      Cervical back: Normal range of motion and neck supple.      Comments: No tenderness to palpation of chest or deformities noted.   strength 5/5.     Neurological: He is alert and oriented to person, place, and time. He has normal strength. GCS score is 15. GCS eye subscore is 4. GCS verbal subscore is 5. GCS motor subscore is 6.   Skin: Skin is warm and dry. No erythema.   Psychiatric: He has a normal  mood and affect. His behavior is normal. Thought content normal.         ED Course   Procedures  Labs Reviewed   COMPREHENSIVE METABOLIC PANEL - Abnormal; Notable for the following components:       Result Value    AST 69 (*)      (*)     All other components within normal limits   CBC W/ AUTO DIFFERENTIAL   TROPONIN I   TROPONIN I   B-TYPE NATRIURETIC PEPTIDE          Imaging Results              X-Ray Chest PA And Lateral (Final result)  Result time 09/01/23 17:53:58      Final result by Erki Leyva MD (09/01/23 17:53:58)                   Impression:      No acute abnormality.      Electronically signed by: Erik Leyva  Date:    09/01/2023  Time:    17:53               Narrative:    EXAMINATION:  XR CHEST PA AND LATERAL    CLINICAL HISTORY:  Chest Pain;    TECHNIQUE:  PA and lateral views of the chest were performed.    COMPARISON:  11/04/2021    FINDINGS:  The lungs are clear, with normal appearance of pulmonary vasculature and no pleural effusion or pneumothorax.    The cardiac silhouette is normal in size. The hilar and mediastinal contours are unremarkable.    Bones are intact.                                       Medications   aspirin tablet 325 mg (325 mg Oral Given 9/1/23 1639)   acetaminophen tablet 500 mg (500 mg Oral Given 9/1/23 1805)     Medical Decision Making  Patient is a 48-year-old male who presents to emergency room for chest pain that onset yesterday at 12:00 p.m. and resolved with recurrence again today at 1:00 p.m. Vital signs stable and within normal limits.  Physical exam unremarkable.  Lungs clear to auscultation.  Heart with regular rate and rhythm.  No murmurs.  No tenderness to palpation of abdomen or chest.  No tenderness to palpation of left arm or deformities noted.  Patient given aspirin and Tylenol for pain.    Differential Diagnosis for chest pain includes ischemic chest pain (STEMI, NonSTEMI, or unstable angina), pulmonary embolism, aortic dissection,  pericarditis, chest wall pain (rib strain, muscle strain, shingles), pneumonia, referred pain from the abdomen (biliary colic, cholecystitis, gastritis, gas pains, pancreatitis), anxiety or other causes of chest pain (GERD, esophageal spasm).  EKG with left axis deviation.  No signs of ST elevation.  Troponin negative.  Unlikely ischemic chest pain. Patient with heart score of 3, low risk.  Also unlikely pulmonary embolism as patient does not have shortness of breath or changes in heart rate such as tachycardia.  He is also low risk for pulmonary embolism due to Wells' criteria.  No pneumonia seen on chest x-ray.  Lab work relatively unremarkable aside from elevated LFTs which has been seen on previous lab work.  Unlikely referred pain from abdomen secondary to biliary colic or cholecystitis.  Blood pressure stable gradual improvement in chest pain not suggestive of aortic dissection.  Clinical presentation and physical exam most suggestive of chest wall pain secondary to possible strain.  Will discharge with cardiology follow-up.    On final assessment, the patient appears well and comfortable for discharge. I see no indication of an emergent process beyond that addressed during our encounter but have counseled the patient/family regarding the need for prompt follow-up as well as the indications that should prompt immediate return to the emergency room should new or worrisome developments occur.  The patient/family has been provided with verbal and printed direction regarding our final diagnosis(es) as well as instructions regarding use of OTC and/or Rx medications intended to manage the patient's conditions. Patient verbalized understanding and is agreeable.     Amount and/or Complexity of Data Reviewed  Labs: ordered. Decision-making details documented in ED Course.  Radiology: ordered. Decision-making details documented in ED Course.    Risk  OTC drugs.               ED Course as of 09/01/23 2106   Fri Sep 01,  2023 1600 EKG: Rate 73.  Normal sinus rhythm.  LVH criteria.  No STEMI. [RN]   1744 BNP  BNP unremarkable. [BJ]   1744 CBC auto differential  CBC unremarkable.  No signs of anemia. [BJ]   1744 Comprehensive metabolic panel(!)  CMP with elevation in LFTs.  Seen on previous lab work. [BJ]   1745 Troponin I #1  Troponin negative. [BJ]   1810 X-Ray Chest PA And Lateral  Chest x-ray without acute abnormality. [BJ]   1906 Troponin I #2  Troponin 2 negative.  [BJ]      ED Course User Index  [BJ] Maura Easley PA-C  [RN] Peter Segura Jr., MD                      Clinical Impression:   Final diagnoses:  [R07.9] Chest pain  [R07.89] Left-sided chest wall pain (Primary)        ED Disposition Condition    Discharge Stable          ED Prescriptions    None       Follow-up Information    None          Maura Easley PA-C  09/01/23 1925       Maura Easley PA-C  09/01/23 2106

## 2023-09-01 NOTE — ED NOTES
Patient presents to the ED with c/o left jaw pain, left chest pain, and left arm pain that started yesterday. Patient states it was pretty constant for several hours yesterday and relieved on its own. Patient states the pain started back today and is worse than it was yesterday. States pain worse in the left arm. Describes pain to be sharp in nature. Denies cardiac hx. Patient states he does do a lot of repetitive movement /lifting/pulling with work but does not recall a known injury. 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 Denies cough, congestion, fever. Denies worse with exertion. Denies SOB. Patient states his only medical history was palpitations s/t Covid but states this has resolved since. Patient updated on care plan. Medicated per MAR. Bloodwork sent to lab. Vitals stable. Safety intact. SO at bedside. Pete. Tanner.

## 2023-09-01 NOTE — FIRST PROVIDER EVALUATION
Emergency Department TeleTriage Encounter Note      CHIEF COMPLAINT    Chief Complaint   Patient presents with    Chest Pain     Pt presents to the ed with chest pain and sob , non traumatic.        VITAL SIGNS   Initial Vitals [09/01/23 1556]   BP Pulse Resp Temp SpO2   (!) 142/81 67 20 97.7 °F (36.5 °C) 100 %      MAP       --            ALLERGIES    Review of patient's allergies indicates:   Allergen Reactions    Grass pollen-june grass standard     Tree pollen-riddhi        PROVIDER TRIAGE NOTE  This is a teletriage evaluation of a 48 y.o. male presenting to the ED with c/o chest pain and shortness of breath that began yesterday around 12p. Resolved. Onset again today about 4 hours ago. No meds today. Limited physical exam via telehealth: The patient is awake, alert, answering questions appropriately and is not in respiratory distress. Initial orders will be placed and care will be transferred to an alternate provider when patient is roomed for a full evaluation. Any additional orders and the final disposition will be determined by that provider.         ORDERS  Labs Reviewed   CBC W/ AUTO DIFFERENTIAL   COMPREHENSIVE METABOLIC PANEL   TROPONIN I   TROPONIN I   B-TYPE NATRIURETIC PEPTIDE       ED Orders (720h ago, onward)      Start Ordered     Status Ordering Provider    09/01/23 1924 09/01/23 1623  Troponin I #2  Once Timed         Ordered IRLANDA FABIAN    09/01/23 1630 09/01/23 1623  aspirin tablet 325 mg  ED 1 Time         Ordered ILRANDA FABIAN    09/01/23 1624 09/01/23 1623  Vital signs  Every 15 min         Ordered IRLANDA FABIAN    09/01/23 1624 09/01/23 1623  Cardiac Monitoring - Adult  Continuous        Comments: Notify Physician If:    Ordered IRLANDA FABIAN    09/01/23 1624 09/01/23 1623  Pulse Oximetry Continuous  Continuous         Ordered IRLANDA FABIAN    09/01/23 1624 09/01/23 1623  Diet NPO  Diet effective now         Ordered IRLANDA FABIAN    09/01/23 1624 09/01/23 1623  Saline  lock IV  Once         Ordered CAREN, IRLANDA P.    09/01/23 1624 09/01/23 1623  EKG 12-lead  Once        Comments: Do not perform if previously done during this visit/ triage    Ordered CAREN, IRLANDA P.    09/01/23 1624 09/01/23 1623  CBC auto differential  STAT         Ordered FABIAN, IRLANDA P.    09/01/23 1624 09/01/23 1623  Comprehensive metabolic panel  STAT         Ordered CAREN, IRLANDA P.    09/01/23 1624 09/01/23 1623  Troponin I #1  STAT         Ordered CAREN, IRLANDA P.    09/01/23 1624 09/01/23 1623  BNP  STAT         Ordered CAREN, IRLANDA P.    09/01/23 1624 09/01/23 1623  X-Ray Chest PA And Lateral  1 time imaging         Ordered CAREN IRLANDA P.    09/01/23 1559 09/01/23 1558  EKG 12-lead  Once         Completed by ROD ROBBINS on 9/1/2023 at  4:01 PM TONNY PONCE              Virtual Visit Note: The provider triage portion of this emergency department evaluation and documentation was performed via [employer], a HIPAA-compliant telemedicine application, in concert with a tele-presenter in the room. A face to face patient evaluation with one of my colleagues will occur once the patient is placed in an emergency department room.      DISCLAIMER: This note was prepared with [email or web address] voice recognition transcription software. Garbled syntax, mangled pronouns, and other bizarre constructions may be attributed to that software system.

## 2023-09-02 NOTE — DISCHARGE INSTRUCTIONS
Please follow-up with cardiology for symptoms in order to receive further testing and management.  I do not suspect that this is a heart attack at this time, however if symptoms worsened in severity please come back to see us!  You can take ibuprofen or Tylenol as needed for pain.  Otherwise, stay hydrated at work!    Thank you for allowing me and my emergency team to take care of you here today! I hope you feel better soon. Please do not hesitate to return with any additional concerns that may arise from this or any new problem you encounter.    Our goal in the emergency department is to always give you outstanding care and exceptional service. If you receive a survey by mail or e-mail in the next week regarding your experience in our ED, we would greatly appreciate you completing it. Your feedback provides us with a way to recognize our staff who give very good care and it helps us learn how to improve when your experience was below the excellence we aspire to be!    Maura Easley PA-C

## 2023-10-13 ENCOUNTER — TELEPHONE (OUTPATIENT)
Dept: ADMINISTRATIVE | Facility: OTHER | Age: 48
End: 2023-10-13
Payer: COMMERCIAL